# Patient Record
Sex: FEMALE | Race: WHITE | NOT HISPANIC OR LATINO | Employment: OTHER | URBAN - METROPOLITAN AREA
[De-identification: names, ages, dates, MRNs, and addresses within clinical notes are randomized per-mention and may not be internally consistent; named-entity substitution may affect disease eponyms.]

---

## 2018-10-02 ENCOUNTER — HOSPITAL ENCOUNTER (OUTPATIENT)
Dept: GENERAL RADIOLOGY | Facility: HOSPITAL | Age: 63
Discharge: HOME | End: 2018-10-02
Admitting: ORTHOPAEDIC SURGERY

## 2018-10-02 ENCOUNTER — APPOINTMENT (OUTPATIENT)
Dept: PREADMISSION TESTING | Facility: HOSPITAL | Age: 63
End: 2018-10-02

## 2018-10-02 VITALS — HEIGHT: 66 IN | WEIGHT: 203.93 LBS | BODY MASS INDEX: 32.77 KG/M2

## 2018-10-02 LAB
ANION GAP SERPL CALCULATED.3IONS-SCNC: 4 MMOL/L (ref 3–11)
BUN BLD-MCNC: 22 MG/DL (ref 9–23)
BUN/CREAT SERPL: 29.7 (ref 7–25)
CALCIUM SPEC-SCNC: 9.2 MG/DL (ref 8.7–10.4)
CHLORIDE SERPL-SCNC: 105 MMOL/L (ref 99–109)
CO2 SERPL-SCNC: 28 MMOL/L (ref 20–31)
CREAT BLD-MCNC: 0.74 MG/DL (ref 0.6–1.3)
DEPRECATED RDW RBC AUTO: 38.9 FL (ref 37–54)
ERYTHROCYTE [DISTWIDTH] IN BLOOD BY AUTOMATED COUNT: 12.6 % (ref 11.3–14.5)
GFR SERPL CREATININE-BSD FRML MDRD: 80 ML/MIN/1.73
GLUCOSE BLD-MCNC: 93 MG/DL (ref 70–100)
HBA1C MFR BLD: 5.6 % (ref 4.8–5.6)
HCT VFR BLD AUTO: 43.9 % (ref 34.5–44)
HGB BLD-MCNC: 14.3 G/DL (ref 11.5–15.5)
MCH RBC QN AUTO: 27.8 PG (ref 27–31)
MCHC RBC AUTO-ENTMCNC: 32.6 G/DL (ref 32–36)
MCV RBC AUTO: 85.2 FL (ref 80–99)
PLATELET # BLD AUTO: 241 10*3/MM3 (ref 150–450)
PMV BLD AUTO: 10.7 FL (ref 6–12)
POTASSIUM BLD-SCNC: 4.2 MMOL/L (ref 3.5–5.5)
RBC # BLD AUTO: 5.15 10*6/MM3 (ref 3.89–5.14)
SODIUM BLD-SCNC: 137 MMOL/L (ref 132–146)
WBC NRBC COR # BLD: 7.67 10*3/MM3 (ref 3.5–10.8)

## 2018-10-02 PROCEDURE — 93010 ELECTROCARDIOGRAM REPORT: CPT | Performed by: INTERNAL MEDICINE

## 2018-10-02 PROCEDURE — 80048 BASIC METABOLIC PNL TOTAL CA: CPT | Performed by: ORTHOPAEDIC SURGERY

## 2018-10-02 PROCEDURE — 36415 COLL VENOUS BLD VENIPUNCTURE: CPT

## 2018-10-02 PROCEDURE — 85027 COMPLETE CBC AUTOMATED: CPT | Performed by: ORTHOPAEDIC SURGERY

## 2018-10-02 PROCEDURE — 71046 X-RAY EXAM CHEST 2 VIEWS: CPT

## 2018-10-02 PROCEDURE — 83036 HEMOGLOBIN GLYCOSYLATED A1C: CPT | Performed by: ORTHOPAEDIC SURGERY

## 2018-10-02 PROCEDURE — 93005 ELECTROCARDIOGRAM TRACING: CPT

## 2018-10-02 RX ORDER — ACETAMINOPHEN 325 MG/1
650 TABLET ORAL EVERY 6 HOURS PRN
COMMUNITY

## 2018-10-02 ASSESSMENT — KOOS JR
KOOS JR SCORE: 68.284
KOOS JR SCORE: 7

## 2018-10-02 NOTE — DISCHARGE INSTRUCTIONS

## 2018-10-02 NOTE — PAT
EKG faxed to anesthesia, cleared by Dr. Dangelo for surgery.    Patient to apply Chlorhexadine wipes  to surgical area (as instructed) the night before procedure and the AM of procedure. Wipes provided.    Patient instructed to drink 20 ounces (or until full) of Gatorade or 20 ounces of G2 (if diabetic) and complete 3 hours before your surgery start time. (NO RED Gatorade or G2)    Patient verbalized understanding.    Patient attended joint replacement class today during PAT visit.    Patient instructed to return to PAT after joint class for directions to radiology for CXR, verbalized understanding

## 2018-10-11 ENCOUNTER — HOSPITAL ENCOUNTER (INPATIENT)
Facility: HOSPITAL | Age: 63
LOS: 1 days | Discharge: HOME | End: 2018-10-12
Attending: ORTHOPAEDIC SURGERY | Admitting: ORTHOPAEDIC SURGERY

## 2018-10-11 ENCOUNTER — ANESTHESIA EVENT (OUTPATIENT)
Dept: PERIOP | Facility: HOSPITAL | Age: 63
End: 2018-10-11

## 2018-10-11 ENCOUNTER — ANESTHESIA (OUTPATIENT)
Dept: PERIOP | Facility: HOSPITAL | Age: 63
End: 2018-10-11

## 2018-10-11 ENCOUNTER — APPOINTMENT (OUTPATIENT)
Dept: GENERAL RADIOLOGY | Facility: HOSPITAL | Age: 63
End: 2018-10-11

## 2018-10-11 DIAGNOSIS — Z74.09 IMPAIRED FUNCTIONAL MOBILITY, BALANCE, GAIT, AND ENDURANCE: Primary | ICD-10-CM

## 2018-10-11 DIAGNOSIS — Z78.9 IMPAIRED MOBILITY AND ADLS: ICD-10-CM

## 2018-10-11 DIAGNOSIS — Z96.652 STATUS POST TOTAL LEFT KNEE REPLACEMENT: ICD-10-CM

## 2018-10-11 DIAGNOSIS — Z74.09 IMPAIRED MOBILITY AND ADLS: ICD-10-CM

## 2018-10-11 PROBLEM — M17.12 PRIMARY OSTEOARTHRITIS OF LEFT KNEE: Status: ACTIVE | Noted: 2018-10-11

## 2018-10-11 PROCEDURE — 25010000002 HYDROMORPHONE PER 4 MG: Performed by: ORTHOPAEDIC SURGERY

## 2018-10-11 PROCEDURE — C1776 JOINT DEVICE (IMPLANTABLE): HCPCS | Performed by: ORTHOPAEDIC SURGERY

## 2018-10-11 PROCEDURE — C1713 ANCHOR/SCREW BN/BN,TIS/BN: HCPCS | Performed by: ORTHOPAEDIC SURGERY

## 2018-10-11 PROCEDURE — 0SRD0J9 REPLACEMENT OF LEFT KNEE JOINT WITH SYNTHETIC SUBSTITUTE, CEMENTED, OPEN APPROACH: ICD-10-PCS | Performed by: ORTHOPAEDIC SURGERY

## 2018-10-11 PROCEDURE — 25010000002 ROPIVACAINE PER 1 MG: Performed by: ORTHOPAEDIC SURGERY

## 2018-10-11 PROCEDURE — 25010000002 PHENYLEPHRINE PER 1 ML: Performed by: NURSE ANESTHETIST, CERTIFIED REGISTERED

## 2018-10-11 PROCEDURE — C1755 CATHETER, INTRASPINAL: HCPCS | Performed by: ORTHOPAEDIC SURGERY

## 2018-10-11 PROCEDURE — G8979 MOBILITY GOAL STATUS: HCPCS

## 2018-10-11 PROCEDURE — 97161 PT EVAL LOW COMPLEX 20 MIN: CPT

## 2018-10-11 PROCEDURE — 25010000002 ROPIVACAINE PER 1 MG: Performed by: NURSE ANESTHETIST, CERTIFIED REGISTERED

## 2018-10-11 PROCEDURE — 73560 X-RAY EXAM OF KNEE 1 OR 2: CPT

## 2018-10-11 PROCEDURE — 25010000003 MORPHINE PER 10 MG: Performed by: ORTHOPAEDIC SURGERY

## 2018-10-11 PROCEDURE — 25010000002 PROPOFOL 1000 MG/ML EMULSION: Performed by: NURSE ANESTHETIST, CERTIFIED REGISTERED

## 2018-10-11 PROCEDURE — 97116 GAIT TRAINING THERAPY: CPT

## 2018-10-11 PROCEDURE — G8978 MOBILITY CURRENT STATUS: HCPCS

## 2018-10-11 DEVICE — GEN II 7.5MM RESUR PAT 29MM
Type: IMPLANTABLE DEVICE | Site: PATELLA | Status: FUNCTIONAL
Brand: GENESIS II

## 2018-10-11 DEVICE — LEGION POSTERIOR STABILIZED HIGH                                    FLEX HIGHLY CROSS LINKED                                    POLYETHYLENE SIZE 3-4 9MM
Type: IMPLANTABLE DEVICE | Site: KNEE | Status: FUNCTIONAL
Brand: LEGION

## 2018-10-11 DEVICE — LEGION POSTERIOR STABILIZED                                    OXINIUM FEMORAL SIZE 6 LEFT
Type: IMPLANTABLE DEVICE | Site: KNEE | Status: FUNCTIONAL
Brand: LEGION

## 2018-10-11 DEVICE — GENESIS II NON-POROUS TIBIAL                                    BASEPLATE SIZE 4 LEFT
Type: IMPLANTABLE DEVICE | Site: KNEE | Status: FUNCTIONAL
Brand: GENESIS II

## 2018-10-11 DEVICE — IMPLANTABLE DEVICE: Type: IMPLANTABLE DEVICE | Site: KNEE | Status: FUNCTIONAL

## 2018-10-11 DEVICE — SMARTSET HIGH PERFORMANCE MV MEDIUM VISCOSITY BONE CEMENT 40G
Type: IMPLANTABLE DEVICE | Status: FUNCTIONAL
Brand: SMARTSET

## 2018-10-11 RX ORDER — CLINDAMYCIN PHOSPHATE 900 MG/50ML
900 INJECTION INTRAVENOUS ONCE
Status: COMPLETED | OUTPATIENT
Start: 2018-10-11 | End: 2018-10-11

## 2018-10-11 RX ORDER — ACETAMINOPHEN 325 MG/1
650 TABLET ORAL EVERY 4 HOURS PRN
Status: DISCONTINUED | OUTPATIENT
Start: 2018-10-11 | End: 2018-10-12 | Stop reason: HOSPADM

## 2018-10-11 RX ORDER — HYDROMORPHONE HYDROCHLORIDE 1 MG/ML
0.5 INJECTION, SOLUTION INTRAMUSCULAR; INTRAVENOUS; SUBCUTANEOUS
Status: DISCONTINUED | OUTPATIENT
Start: 2018-10-11 | End: 2018-10-11 | Stop reason: HOSPADM

## 2018-10-11 RX ORDER — MAGNESIUM HYDROXIDE 1200 MG/15ML
LIQUID ORAL AS NEEDED
Status: DISCONTINUED | OUTPATIENT
Start: 2018-10-11 | End: 2018-10-11 | Stop reason: HOSPADM

## 2018-10-11 RX ORDER — DIPHENHYDRAMINE HYDROCHLORIDE 50 MG/ML
25 INJECTION INTRAMUSCULAR; INTRAVENOUS EVERY 6 HOURS PRN
Status: DISCONTINUED | OUTPATIENT
Start: 2018-10-11 | End: 2018-10-12 | Stop reason: HOSPADM

## 2018-10-11 RX ORDER — DOCUSATE SODIUM 100 MG/1
100 CAPSULE, LIQUID FILLED ORAL 2 TIMES DAILY PRN
Status: DISCONTINUED | OUTPATIENT
Start: 2018-10-11 | End: 2018-10-12 | Stop reason: HOSPADM

## 2018-10-11 RX ORDER — NALOXONE HCL 0.4 MG/ML
0.1 VIAL (ML) INJECTION
Status: DISCONTINUED | OUTPATIENT
Start: 2018-10-11 | End: 2018-10-12 | Stop reason: HOSPADM

## 2018-10-11 RX ORDER — MINERAL OIL AND WHITE PETROLATUM 150; 830 MG/G; MG/G
OINTMENT OPHTHALMIC
Status: DISCONTINUED | OUTPATIENT
Start: 2018-10-11 | End: 2018-10-12 | Stop reason: HOSPADM

## 2018-10-11 RX ORDER — ASPIRIN 325 MG
325 TABLET, DELAYED RELEASE (ENTERIC COATED) ORAL DAILY
Status: DISCONTINUED | OUTPATIENT
Start: 2018-10-12 | End: 2018-10-12 | Stop reason: HOSPADM

## 2018-10-11 RX ORDER — CLINDAMYCIN PHOSPHATE 900 MG/50ML
900 INJECTION INTRAVENOUS EVERY 8 HOURS
Status: COMPLETED | OUTPATIENT
Start: 2018-10-11 | End: 2018-10-12

## 2018-10-11 RX ORDER — HYDROCODONE BITARTRATE AND ACETAMINOPHEN 7.5; 325 MG/1; MG/1
1 TABLET ORAL EVERY 4 HOURS PRN
Status: DISCONTINUED | OUTPATIENT
Start: 2018-10-11 | End: 2018-10-12 | Stop reason: HOSPADM

## 2018-10-11 RX ORDER — ONDANSETRON 2 MG/ML
4 INJECTION INTRAMUSCULAR; INTRAVENOUS ONCE AS NEEDED
Status: DISCONTINUED | OUTPATIENT
Start: 2018-10-11 | End: 2018-10-11 | Stop reason: HOSPADM

## 2018-10-11 RX ORDER — DIPHENHYDRAMINE HCL 25 MG
25 CAPSULE ORAL EVERY 6 HOURS PRN
Status: DISCONTINUED | OUTPATIENT
Start: 2018-10-11 | End: 2018-10-12 | Stop reason: HOSPADM

## 2018-10-11 RX ORDER — SENNA AND DOCUSATE SODIUM 50; 8.6 MG/1; MG/1
2 TABLET, FILM COATED ORAL 2 TIMES DAILY PRN
Status: DISCONTINUED | OUTPATIENT
Start: 2018-10-11 | End: 2018-10-12 | Stop reason: HOSPADM

## 2018-10-11 RX ORDER — FENTANYL CITRATE 50 UG/ML
50 INJECTION, SOLUTION INTRAMUSCULAR; INTRAVENOUS
Status: DISCONTINUED | OUTPATIENT
Start: 2018-10-11 | End: 2018-10-11 | Stop reason: HOSPADM

## 2018-10-11 RX ORDER — FAMOTIDINE 20 MG/1
20 TABLET, FILM COATED ORAL ONCE
Status: COMPLETED | OUTPATIENT
Start: 2018-10-11 | End: 2018-10-11

## 2018-10-11 RX ORDER — LIDOCAINE HYDROCHLORIDE 10 MG/ML
0.5 INJECTION, SOLUTION EPIDURAL; INFILTRATION; INTRACAUDAL; PERINEURAL ONCE AS NEEDED
Status: COMPLETED | OUTPATIENT
Start: 2018-10-11 | End: 2018-10-11

## 2018-10-11 RX ORDER — SODIUM CHLORIDE 0.9 % (FLUSH) 0.9 %
3-10 SYRINGE (ML) INJECTION AS NEEDED
Status: DISCONTINUED | OUTPATIENT
Start: 2018-10-11 | End: 2018-10-11 | Stop reason: HOSPADM

## 2018-10-11 RX ORDER — ACETAMINOPHEN 650 MG/1
650 SUPPOSITORY RECTAL EVERY 4 HOURS PRN
Status: DISCONTINUED | OUTPATIENT
Start: 2018-10-11 | End: 2018-10-12 | Stop reason: HOSPADM

## 2018-10-11 RX ORDER — SENNOSIDES 8.6 MG
1 TABLET ORAL NIGHTLY
Status: DISCONTINUED | OUTPATIENT
Start: 2018-10-11 | End: 2018-10-12 | Stop reason: HOSPADM

## 2018-10-11 RX ORDER — FAMOTIDINE 10 MG/ML
20 INJECTION, SOLUTION INTRAVENOUS ONCE
Status: DISCONTINUED | OUTPATIENT
Start: 2018-10-11 | End: 2018-10-11 | Stop reason: HOSPADM

## 2018-10-11 RX ORDER — HYDROCODONE BITARTRATE AND ACETAMINOPHEN 7.5; 325 MG/1; MG/1
2 TABLET ORAL EVERY 4 HOURS PRN
Status: DISCONTINUED | OUTPATIENT
Start: 2018-10-11 | End: 2018-10-12 | Stop reason: HOSPADM

## 2018-10-11 RX ORDER — TRANEXAMIC ACID 100 MG/ML
INJECTION, SOLUTION INTRAVENOUS AS NEEDED
Status: DISCONTINUED | OUTPATIENT
Start: 2018-10-11 | End: 2018-10-11 | Stop reason: SURG

## 2018-10-11 RX ORDER — BUPIVACAINE HYDROCHLORIDE 2.5 MG/ML
INJECTION, SOLUTION EPIDURAL; INFILTRATION; INTRACAUDAL AS NEEDED
Status: DISCONTINUED | OUTPATIENT
Start: 2018-10-11 | End: 2018-10-11 | Stop reason: SURG

## 2018-10-11 RX ORDER — OXYCODONE HYDROCHLORIDE AND ACETAMINOPHEN 5; 325 MG/1; MG/1
1 TABLET ORAL EVERY 4 HOURS PRN
Status: DISCONTINUED | OUTPATIENT
Start: 2018-10-11 | End: 2018-10-12 | Stop reason: HOSPADM

## 2018-10-11 RX ORDER — SODIUM CHLORIDE 0.9 % (FLUSH) 0.9 %
3 SYRINGE (ML) INJECTION EVERY 12 HOURS SCHEDULED
Status: DISCONTINUED | OUTPATIENT
Start: 2018-10-11 | End: 2018-10-11 | Stop reason: HOSPADM

## 2018-10-11 RX ORDER — BISACODYL 5 MG/1
10 TABLET, DELAYED RELEASE ORAL DAILY PRN
Status: DISCONTINUED | OUTPATIENT
Start: 2018-10-11 | End: 2018-10-12 | Stop reason: HOSPADM

## 2018-10-11 RX ORDER — ACETAMINOPHEN 160 MG/5ML
650 SOLUTION ORAL EVERY 4 HOURS PRN
Status: DISCONTINUED | OUTPATIENT
Start: 2018-10-11 | End: 2018-10-12 | Stop reason: HOSPADM

## 2018-10-11 RX ORDER — SODIUM CHLORIDE, SODIUM LACTATE, POTASSIUM CHLORIDE, CALCIUM CHLORIDE 600; 310; 30; 20 MG/100ML; MG/100ML; MG/100ML; MG/100ML
9 INJECTION, SOLUTION INTRAVENOUS CONTINUOUS
Status: DISCONTINUED | OUTPATIENT
Start: 2018-10-11 | End: 2018-10-12 | Stop reason: HOSPADM

## 2018-10-11 RX ORDER — ONDANSETRON 2 MG/ML
4 INJECTION INTRAMUSCULAR; INTRAVENOUS EVERY 6 HOURS PRN
Status: DISCONTINUED | OUTPATIENT
Start: 2018-10-11 | End: 2018-10-12 | Stop reason: HOSPADM

## 2018-10-11 RX ORDER — MELOXICAM 7.5 MG/1
15 TABLET ORAL DAILY
Status: DISCONTINUED | OUTPATIENT
Start: 2018-10-11 | End: 2018-10-12 | Stop reason: HOSPADM

## 2018-10-11 RX ORDER — BUPIVACAINE HYDROCHLORIDE 5 MG/ML
INJECTION, SOLUTION EPIDURAL; INTRACAUDAL AS NEEDED
Status: DISCONTINUED | OUTPATIENT
Start: 2018-10-11 | End: 2018-10-11 | Stop reason: SURG

## 2018-10-11 RX ORDER — BISACODYL 10 MG
10 SUPPOSITORY, RECTAL RECTAL DAILY PRN
Status: DISCONTINUED | OUTPATIENT
Start: 2018-10-11 | End: 2018-10-12 | Stop reason: HOSPADM

## 2018-10-11 RX ORDER — LIDOCAINE HYDROCHLORIDE 10 MG/ML
INJECTION, SOLUTION EPIDURAL; INFILTRATION; INTRACAUDAL; PERINEURAL AS NEEDED
Status: DISCONTINUED | OUTPATIENT
Start: 2018-10-11 | End: 2018-10-11 | Stop reason: SURG

## 2018-10-11 RX ORDER — ONDANSETRON 4 MG/1
4 TABLET, FILM COATED ORAL EVERY 6 HOURS PRN
Status: DISCONTINUED | OUTPATIENT
Start: 2018-10-11 | End: 2018-10-12 | Stop reason: HOSPADM

## 2018-10-11 RX ORDER — SODIUM CHLORIDE 9 MG/ML
100 INJECTION, SOLUTION INTRAVENOUS CONTINUOUS
Status: DISCONTINUED | OUTPATIENT
Start: 2018-10-11 | End: 2018-10-12 | Stop reason: HOSPADM

## 2018-10-11 RX ORDER — HYDROMORPHONE HYDROCHLORIDE 1 MG/ML
0.5 INJECTION, SOLUTION INTRAMUSCULAR; INTRAVENOUS; SUBCUTANEOUS
Status: DISCONTINUED | OUTPATIENT
Start: 2018-10-11 | End: 2018-10-12 | Stop reason: HOSPADM

## 2018-10-11 RX ADMIN — MINERAL OIL AND WHITE PETROLATUM: 150; 830 OINTMENT OPHTHALMIC at 11:58

## 2018-10-11 RX ADMIN — PROPOFOL 55 MCG/KG/MIN: 10 INJECTION, EMULSION INTRAVENOUS at 07:38

## 2018-10-11 RX ADMIN — TRANEXAMIC ACID 1000 MG: 100 INJECTION, SOLUTION INTRAVENOUS at 07:40

## 2018-10-11 RX ADMIN — LIDOCAINE HYDROCHLORIDE 0.5 ML: 10 INJECTION, SOLUTION EPIDURAL; INFILTRATION; INTRACAUDAL; PERINEURAL at 06:43

## 2018-10-11 RX ADMIN — OXYCODONE HYDROCHLORIDE AND ACETAMINOPHEN 1 TABLET: 5; 325 TABLET ORAL at 12:16

## 2018-10-11 RX ADMIN — LIDOCAINE HYDROCHLORIDE 3 ML: 10 INJECTION, SOLUTION EPIDURAL; INFILTRATION; INTRACAUDAL; PERINEURAL at 07:32

## 2018-10-11 RX ADMIN — MELOXICAM 15 MG: 7.5 TABLET ORAL at 12:44

## 2018-10-11 RX ADMIN — CLINDAMYCIN PHOSPHATE 900 MG: 18 INJECTION, SOLUTION INTRAVENOUS at 07:36

## 2018-10-11 RX ADMIN — FAMOTIDINE 20 MG: 20 TABLET ORAL at 06:43

## 2018-10-11 RX ADMIN — BUPIVACAINE HYDROCHLORIDE 20 ML: 2.5 INJECTION, SOLUTION EPIDURAL; INFILTRATION; INTRACAUDAL; PERINEURAL at 09:42

## 2018-10-11 RX ADMIN — HYDROCODONE BITARTRATE AND ACETAMINOPHEN 2 TABLET: 7.5; 325 TABLET ORAL at 20:38

## 2018-10-11 RX ADMIN — ONDANSETRON 4 MG: 4 TABLET, FILM COATED ORAL at 16:34

## 2018-10-11 RX ADMIN — PHENYLEPHRINE HYDROCHLORIDE 80 MCG: 10 INJECTION INTRAVENOUS at 08:28

## 2018-10-11 RX ADMIN — SODIUM CHLORIDE, POTASSIUM CHLORIDE, SODIUM LACTATE AND CALCIUM CHLORIDE: 600; 310; 30; 20 INJECTION, SOLUTION INTRAVENOUS at 08:47

## 2018-10-11 RX ADMIN — SODIUM CHLORIDE, POTASSIUM CHLORIDE, SODIUM LACTATE AND CALCIUM CHLORIDE 9 ML/HR: 600; 310; 30; 20 INJECTION, SOLUTION INTRAVENOUS at 06:43

## 2018-10-11 RX ADMIN — PHENYLEPHRINE HYDROCHLORIDE 80 MCG: 10 INJECTION INTRAVENOUS at 08:15

## 2018-10-11 RX ADMIN — BUPIVACAINE HYDROCHLORIDE 2 ML: 5 INJECTION, SOLUTION EPIDURAL; INTRACAUDAL at 07:33

## 2018-10-11 RX ADMIN — PHENYLEPHRINE HYDROCHLORIDE 80 MCG: 10 INJECTION INTRAVENOUS at 08:50

## 2018-10-11 RX ADMIN — HYDROMORPHONE HYDROCHLORIDE 0.5 MG: 1 INJECTION, SOLUTION INTRAMUSCULAR; INTRAVENOUS; SUBCUTANEOUS at 10:51

## 2018-10-11 RX ADMIN — CLINDAMYCIN PHOSPHATE 900 MG: 900 INJECTION, SOLUTION INTRAVENOUS at 16:30

## 2018-10-11 RX ADMIN — SENNOSIDES 8.6 MG: 8.6 TABLET, FILM COATED ORAL at 20:39

## 2018-10-11 RX ADMIN — HYDROMORPHONE HYDROCHLORIDE 0.5 MG: 1 INJECTION, SOLUTION INTRAMUSCULAR; INTRAVENOUS; SUBCUTANEOUS at 12:44

## 2018-10-11 RX ADMIN — ONDANSETRON 4 MG: 4 TABLET, FILM COATED ORAL at 12:16

## 2018-10-11 RX ADMIN — PHENYLEPHRINE HYDROCHLORIDE 80 MCG: 10 INJECTION INTRAVENOUS at 08:34

## 2018-10-11 RX ADMIN — TRANEXAMIC ACID 1000 MG: 100 INJECTION, SOLUTION INTRAVENOUS at 09:03

## 2018-10-11 RX ADMIN — SODIUM CHLORIDE 100 ML/HR: 9 INJECTION, SOLUTION INTRAVENOUS at 10:52

## 2018-10-11 RX ADMIN — PHENYLEPHRINE HYDROCHLORIDE 80 MCG: 10 INJECTION INTRAVENOUS at 08:21

## 2018-10-11 RX ADMIN — OXYCODONE HYDROCHLORIDE AND ACETAMINOPHEN 1 TABLET: 5; 325 TABLET ORAL at 16:30

## 2018-10-11 RX ADMIN — ROPIVACAINE HYDROCHLORIDE 10 ML/HR: 5 INJECTION, SOLUTION EPIDURAL; INFILTRATION; PERINEURAL at 09:52

## 2018-10-11 RX ADMIN — PHENYLEPHRINE HYDROCHLORIDE 160 MCG: 10 INJECTION INTRAVENOUS at 08:56

## 2018-10-11 NOTE — PLAN OF CARE
Problem: Patient Care Overview  Goal: Plan of Care Review  Outcome: Ongoing (interventions implemented as appropriate)   10/11/18 7251   Plan of Care Review   Progress improving   OTHER   Outcome Summary Patient ambulated 130 feet with RW, limited by pain. ROM to be initiated POD#1. Plan is d/c home with family and HHPT tomorrow. Will continue to progress as able. PADD score of 8.    Coping/Psychosocial   Plan of Care Reviewed With patient;family

## 2018-10-11 NOTE — H&P
Patient Name: Evelyn Medina  MRN: [id number]  : 1955  DOS: 10/11/2018    Attending: Dewey Jimenez,*    Primary Care Provider: Provider, No Known      Chief complaint:  Left knee pain    Subjective   Patient is a 63 y.o. female presented for left total knee arthroplasty by Dr. Jimenez under spinal anesthesia. She tolerated surgery well and is admitted for further medical management. Her knee has been painful for about a year and a half. She denies use of assistive device for ambulation or recent falls.    When seen postop she is doing well. She reports moderate knee pain. She denies nausea, shortness of breath or chest pain. No hx of DVT or PE.    ( Above is noted/ agree. Doing well. Fair pain control. Ambulated to BR when I saw her , then walked 130 ft with PT with RW. Mostly bothered by right eye irritation postop .)wy       Allergies:  Allergies   Allergen Reactions   • Penicillins Rash   • Ibuprofen Other (See Comments)     Irregular heart beat        Meds:  Prescriptions Prior to Admission   Medication Sig Dispense Refill Last Dose   • acetaminophen (TYLENOL) 325 MG tablet Take 650 mg by mouth Every 6 (Six) Hours As Needed for Mild Pain .   Past Month at Unknown time   • Lifitegrast (XIIDRA OP) Administer 1 drop to both eyes 2 (Two) Times a Day.   10/11/2018 at 0400   • Multiple Vitamins-Minerals (WOMENS MULTIVITAMIN PO) Take 1 tablet by mouth Daily.   10/10/2018 at 0800   • polyethyl glycol-propyl glycol (SYSTANE) 0.4-0.3 % solution ophthalmic solution Administer 1 drop to both eyes Every Night.   10/10/2018 at 2200   • Sennosides (SENNA LAX PO) Take 1 tablet by mouth Daily.   10/10/2018 at 0800       History:   Past Medical History:   Diagnosis Date   • Arthritis    • History of transfusion    • Osteoarthritis    • PONV (postoperative nausea and vomiting)      Past Surgical History:   Procedure Laterality Date   •  SECTION      X 2   • EYE SURGERY Left    • KNEE SURGERY Left      "torn menisus     History reviewed. No pertinent family history.  Social History   Substance Use Topics   • Smoking status: Former Smoker     Quit date: 2000   • Smokeless tobacco: Never Used   • Alcohol use No   She lives with her son. She has 2 children. She is retired RN.    Review of Systems  All systems were reviewed and negative except for:  Gastrointestinal: postitive for  constipation    Vital Signs  /66 (BP Location: Left arm, Patient Position: Lying)   Pulse 66   Temp 97.6 °F (36.4 °C) (Oral)   Resp 16   Ht 167.6 cm (66\")   Wt 92.5 kg (203 lb 14.8 oz)   SpO2 98%   BMI 32.91 kg/m²     Physical Exam:    General Appearance:    Alert, cooperative, in no acute distress   Head:    Normocephalic, without obvious abnormality, atraumatic   Eyes:            Lids and lashes normal, conjunctivae and sclerae normal, no   icterus, no pallor, corneas clear ( slight redness/ right eye)wy   Ears:    Ears appear intact with no abnormalities noted   Neck:   No adenopathy, supple, trachea midline, no thyromegaly, no     carotid bruit, no JVD   Lungs:     Clear to auscultation,respirations regular, even and                   unlabored    Heart:    Regular rhythm and normal rate, normal S1 and S2, no            murmur, no gallop, no rub, no click   Abdomen:     Normal bowel sounds, no masses, no organomegaly, soft        non-tender, non-distended, no guarding, no rebound                 tenderness   Genitalia:    Deferred   Extremities:  Left knee ACE wrap CDI. Nerve block present   Pulses:   Pulses palpable and equal bilaterally   Skin:   No bleeding, bruising or rash   Neurologic:   Cranial nerves 2 - 12 grossly intact, sensation intact. Flexion and dorsiflexion intact bilateral feet.        I reviewed the patient's new clinical results.     Results for AMINA DE DIOS (MRN [id number]) as of 10/11/2018 11:58   Ref. Range 10/2/2018 12:24   Glucose Latest Ref Range: 70 - 100 mg/dL 93   Sodium Latest Ref Range: " 132 - 146 mmol/L 137   Potassium Latest Ref Range: 3.5 - 5.5 mmol/L 4.2   CO2 Latest Ref Range: 20.0 - 31.0 mmol/L 28.0   Chloride Latest Ref Range: 99 - 109 mmol/L 105   Anion Gap Latest Ref Range: 3.0 - 11.0 mmol/L 4.0   Creatinine Latest Ref Range: 0.60 - 1.30 mg/dL 0.74   BUN Latest Ref Range: 9 - 23 mg/dL 22   BUN/Creatinine Ratio Latest Ref Range: 7.0 - 25.0  29.7 (H)   Calcium Latest Ref Range: 8.7 - 10.4 mg/dL 9.2   eGFR Non African Amer Latest Ref Range: >60 mL/min/1.73 80   Hemoglobin A1C Latest Ref Range: 4.80 - 5.60 % 5.60   WBC Latest Ref Range: 3.50 - 10.80 10*3/mm3 7.67   RBC Latest Ref Range: 3.89 - 5.14 10*6/mm3 5.15 (H)   Hemoglobin Latest Ref Range: 11.5 - 15.5 g/dL 14.3   Hematocrit Latest Ref Range: 34.5 - 44.0 % 43.9   RDW Latest Ref Range: 11.3 - 14.5 % 12.6   MCV Latest Ref Range: 80.0 - 99.0 fL 85.2   MCH Latest Ref Range: 27.0 - 31.0 pg 27.8   MCHC Latest Ref Range: 32.0 - 36.0 g/dL 32.6   MPV Latest Ref Range: 6.0 - 12.0 fL 10.7   Platelets Latest Ref Range: 150 - 450 10*3/mm3 241     Assessment and Plan:     Status post total left knee replacement    Primary osteoarthritis of left knee      Plan  1. PT/OT- early ambulation post op  2. Pain control-prns, AC nerve block   3. IS-encourage  4. DVT proph- mechs/ASA  5. Bowel regimen  6. Resume home medications as appropriate  7. Monitor post-op labs  8. DC planning for home    I have personally performed the evaluation on this patient. My history is consistent  with HPI obtained. My exam findings are listed above. I have personally reviewed and discussed the above formulated treatment plan with pt, family and . HARINDER.wy.      HARINDER Kelly  10/11/18  11:56 AM

## 2018-10-11 NOTE — ANESTHESIA POSTPROCEDURE EVALUATION
Patient: Evelyn Medina    Procedure Summary     Date:  10/11/18 Room / Location:   [facility] /  [facility]    Anesthesia Start:  0724 Anesthesia Stop:      Procedure:  LEFT TOTAL KNEE REPLACEMENT (Left Knee) Diagnosis:       Primary osteoarthritis of left knee      (left knee osteoarthritis)    Surgeon:  Dewey Jimenez MD Provider:  Remington Patel MD    Anesthesia Type:  spinal ASA Status:  Not recorded          Anesthesia Type: spinal  Last vitals  BP   (!) 89/51 (10/11/18 0935)   Temp   97.8 °F (36.6 °C) (10/11/18 0930)   Pulse   64 (10/11/18 0935)   Resp   16 (10/11/18 0930)     SpO2   93 % (10/11/18 0935)     Post Anesthesia Care and Evaluation    Patient location during evaluation: PACU  Patient participation: complete - patient participated  Level of consciousness: awake  Pain score: 0  Pain management: adequate  Airway patency: patent  Anesthetic complications: No anesthetic complications  PONV Status: none  Cardiovascular status: acceptable and stable  Respiratory status: nasal cannula, unassisted, acceptable and spontaneous ventilation  Hydration status: acceptable    Comments: Patient complains of painful right eye.  Patch applied by PACU nurse.

## 2018-10-11 NOTE — ANESTHESIA PROCEDURE NOTES
Spinal Block      Patient location during procedure: OR  Indication:at surgeon's request  Performed By  CRNA: JACLYN SALMON  Preanesthetic Checklist  Completed: patient identified, site marked, surgical consent, pre-op evaluation, timeout performed, IV checked, risks and benefits discussed and monitors and equipment checked  Spinal Block Prep:  Patient Position:sitting  Sterile Tech:cap, gloves, sterile barriers and mask  Prep:Chloraprep  Patient Monitoring:blood pressure monitoring, continuous pulse oximetry and EKG  Spinal Block Procedure  Approach:midline  Guidance:landmark technique and palpation technique  Location:L4-L5  Needle Type:Jeimy  Needle Gauge:25 G  Placement of Spinal needle event:cerebrospinal fluid aspirated    Fluid Appearance:clear  Post Assessment  Patient Tolerance:patient tolerated the procedure well with no apparent complications  Complications no  Additional Notes  Procedure:  Pt assisted to sitting position, with legs in position of comfort over side of bed.  Pt. instructed in optimal spine presentation, the spine was prepped/ Draped and the skin at insertion site was anesthetized with 1% Lidocaine 2 ml.  The spinal needle was then advanced until CSF flow was obtained and LA was injected:      Marcaine 0.5% PSF injected 10 Mg.

## 2018-10-11 NOTE — ANESTHESIA PROCEDURE NOTES
Adductor Canal Catheter      Patient location during procedure: post-op  Reason for block: at surgeon's request and post-op pain management  Performed by  CRNA: JACLYN SALMON  Assisted by: DAVID VINES  Preanesthetic Checklist  Completed: patient identified, site marked, surgical consent, pre-op evaluation, timeout performed, IV checked, risks and benefits discussed and monitors and equipment checked  Prep:  Sterile barriers:cap, gloves, mask and sterile barriers  Prep: ChloraPrep  Patient monitoring: blood pressure monitoring, continuous pulse oximetry and EKG  Procedure    Guidance:ultrasound guided  Images:still images obtained    Block Type:adductor canal block  Injection Technique:catheter  Needle Type:Tuohy and echogenic  Needle Gauge:18 G    Catheter Size:20 G (20g)  Cath Depth at skin: 9 cm  Medications  Local Injected:bupivacaine 0.25% Local Amount Injected:20 (ml)mL  Post Assessment  Injection Assessment: negative aspiration for heme, incremental injection and no paresthesia on injection  Patient Tolerance:comfortable throughout block  Complications:no  Additional Notes  Procedure:             The pt was placed in the Supine position.  The Insertion site was  prepped and Draped in sterile fashion.  The pt was anesthetized with  IV Sedation( see meds).  Skin and cutaneous tissue was infiltrated and anesthetized with 1% Lidocaine 3 mls via a 25g needle.  A BBraun 4 inch 18g echogenic needle was then  inserted approximately midline, mid-thigh and advanced In-plane with Ultrasound guidance.  Normal Saline PSF was utilized for hydrodissection of tissue.  The Vastus medialis and Sartorius muscle where visualized and the needle tip was placed in the adductor canal,  lateral to the femoral artery.  LA injection spread was visualized, injection was incremental 1-5ml, injection pressure was normal or little, no intraneural injection, no vascular injection.  LA dose was injected thru the needle(see dose above).   A BBraun 20g wire stylet catheter was placed via the needle with ultrasound visualization and confirmation with NS fluid bolus. The labeled Catheter was then secured to skin at insertion site with skin afix and steristrips to curled catheter and CHG transparent dressing.  Thank you.

## 2018-10-11 NOTE — BRIEF OP NOTE
TOTAL KNEE ARTHROPLASTY  Progress Note    Evelyn Medina  10/11/2018    Pre-op Diagnosis:   left knee osteoarthritis       Post-Op Diagnosis Codes:     * Primary osteoarthritis of left knee [M17.12]    Procedure/CPT® Codes:  ND TOTAL KNEE ARTHROPLASTY [43198]    Procedure(s):  LEFT TOTAL KNEE REPLACEMENT    Surgeon(s):  Dewey Jimenez MD     Assistant: IAIN Molina    Anesthesia: Spinal, local and adductor canal catheter    Staff:   Circulator: Corey Gillette RN  Scrub Person: Alexsandra Levy  Nursing Assistant: Beth Taylor  Assistant: Desmond Aranda RNFA  [place]: Isabelle Hidalgo RN    Estimated Blood Loss: none    Urine Voided: * No values recorded between 10/11/2018  7:24 AM and 10/11/2018  9:21 AM *    Specimens:                None      Drains:  none    Findings: Left knee severe tricompartmental degenerative changes    Complications: None    Implants: [employer] posterior stabilized total knee arthroplasty with a size 6 femur, 4 tibia, 9 polyethylene and 29 patella    Tourniquet: 82 minutes at 300 mmHg      Dewey Jimenez MD     Date: 10/11/2018  Time: 9:33 AM

## 2018-10-11 NOTE — NURSING NOTE
Patient is complaining of right eye discomfort.  She said it's bothered her since she woke up from surgery.  She said it feels like it's scratched or like something is in it.  Her eye is red, watery, and a little swollen.  Notified Rito Moe CRNA.  Rito came up to assess patient.  He irrigated it with saline and put some eye drops in.  Artificial tears ointment was ordered and placed in her eye, along with a patch over top of it.  Patient reports immediate relief.

## 2018-10-11 NOTE — PLAN OF CARE
Problem: Patient Care Overview  Goal: Plan of Care Review   10/11/18 0360   Plan of Care Review   Progress improving   OTHER   Outcome Summary POD #0. Pt doing very well. Pain well controlled. Nerve cath @ 8ml/hr. Ambulating x1 assist. Voiding WNL. VSS.    Coping/Psychosocial   Plan of Care Reviewed With patient;family

## 2018-10-11 NOTE — ANESTHESIA PREPROCEDURE EVALUATION
Anesthesia Evaluation     Patient summary reviewed and Nursing notes reviewed   history of anesthetic complications: PONV  NPO Solid Status: > 8 hours  NPO Liquid Status: > 8 hours           Airway   Mallampati: II  TM distance: >3 FB  Neck ROM: full  No difficulty expected  Dental      Pulmonary - negative pulmonary ROS and normal exam   Cardiovascular - negative cardio ROS and normal exam        Neuro/Psych- negative ROS  GI/Hepatic/Renal/Endo - negative ROS     Musculoskeletal     Abdominal    Substance History      OB/GYN negative ob/gyn ROS         Other   (+) arthritis                     Anesthesia Plan    ASA 3     spinal   (Adductor canal post op)  intravenous induction   Anesthetic plan, all risks, benefits, and alternatives have been provided, discussed and informed consent has been obtained with: patient.    Plan discussed with CRNA.

## 2018-10-11 NOTE — PROGRESS NOTES
LOS: 0 days   Patient Care Team:  Provider, No Known as PCP - General    Chief Complaint: Right eye pain s/p knee replacement under spinal anesthesia with propofol sedation.    Subjective Pt right eye painful to open, no visible foreign objects to exam, perrla, upper eyelid, reddened, slightly edematous,     History of Present Illness:  Hx of dry eyes, no use of contacts    Subjective    History taken from: patient    Objective     Vital Signs  Temp:  [97.2 °F (36.2 °C)-97.8 °F (36.6 °C)] 97.6 °F (36.4 °C)  Heart Rate:  [58-74] 66  Resp:  [16] 16  BP: ()/(46-95) 120/66    Objective    Results Review:     None    Medication Review:     Assessment/Plan        Status post total left knee replacement    Primary osteoarthritis of left knee      Assessment & Plan  Irrigated eye with sterile NS, cyclopentolate 2% 2 gtts applied, RN to apply antibiotic opthalmic ointment and patch till am Friday.  Thank you    Rito Moe CRNA  10/11/18  3:49 PM    Time: 10min

## 2018-10-11 NOTE — H&P
"Pre-Op H&P  Evelyn Medina  [id number]  1955    Chief complaint: Left knee pain    HPI:    Patient is a 63 y.o.female who presents with left knee pain and found to have left knee osteoarthritis and here today for left total knee replacement.     Review of Systems:  General ROS: negative for chills, fever or skin lesions;  No changes since last office visit  Cardiovascular ROS: no chest pain or dyspnea on exertion  Respiratory ROS: no cough, shortness of breath, or wheezing    Allergies:   Allergies   Allergen Reactions   • Penicillins Rash   • Ibuprofen Other (See Comments)     Irregular heart beat        Home Meds:    No current facility-administered medications on file prior to encounter.      No current outpatient prescriptions on file prior to encounter.       PMH:   Past Medical History:   Diagnosis Date   • Arthritis    • History of transfusion    • Osteoarthritis    • PONV (postoperative nausea and vomiting)      PSH:    Past Surgical History:   Procedure Laterality Date   •  SECTION      X 2   • EYE SURGERY Left    • KNEE SURGERY Left     torn menisus     Social History:   Tobacco:   History   Smoking Status   • Former Smoker   • Quit date:    Smokeless Tobacco   • Never Used      Alcohol:     History   Alcohol Use No       Vitals:           /54 (BP Location: Right arm, Patient Position: Lying)   Pulse 70   Temp 97.2 °F (36.2 °C) (Temporal Artery )   Resp 16   Ht 167.6 cm (66\")   Wt 92.5 kg (203 lb 14.8 oz)   SpO2 93%   BMI 32.91 kg/m²     Physical Exam:  General Appearance:    Alert, cooperative, no distress, appears stated age   Head:    Normocephalic, without obvious abnormality, atraumatic   Lungs:     Clear to auscultation bilaterally, respirations unlabored    Heart:   Regular rate and rhythm, S1 and S2 normal, no murmur, rub    or gallop    Abdomen:    Soft, non-tender.  +bowel sounds   Breast Exam:    deferred   Genitalia:    deferred   Extremities:   Extremities " normal, atraumatic, no cyanosis or edema   Skin:   Skin color, texture, turgor normal, no rashes or lesions   Neurologic:   Grossly intact   Results Review  I reviewed the patient's new clinical results.    Cancer Staging (if applicable)  Cancer Patient: __ yes _x_no __unknown; If yes, clinical stage T:__ N:__M:__, stage group or __N/A    Impression: Left knee osteoarthritis    Plan: Left total knee replacement    Maira Silverman, HARINDER   10/11/2018   6:44 AM

## 2018-10-11 NOTE — THERAPY EVALUATION
Acute Care - Physical Therapy Initial Evaluation  [facility]     Patient Name: Evelyn Medina  : 1955  MRN: [id number]  Today's Date: 10/11/2018   Onset of Illness/Injury or Date of Surgery: 10/11/18  Date of Referral to PT: 10/11/18  Referring Physician: MD Tony      Admit Date: 10/11/2018    Visit Dx:     ICD-10-CM ICD-9-CM   1. Impaired functional mobility, balance, gait, and endurance Z74.09 V49.89     Patient Active Problem List   Diagnosis   • Primary osteoarthritis of left knee   • Status post total left knee replacement     Past Medical History:   Diagnosis Date   • Arthritis    • History of transfusion    • Osteoarthritis    • PONV (postoperative nausea and vomiting)      Past Surgical History:   Procedure Laterality Date   •  SECTION      X 2   • EYE SURGERY Left    • KNEE SURGERY Left     torn menisus        PT ASSESSMENT (last 12 hours)      Physical Therapy Evaluation     Row Name 10/11/18 1327          PT Evaluation Time/Intention    Subjective Information complains of;pain  -LR     Document Type evaluation  -LR     Mode of Treatment physical therapy;individual therapy  -LR     Patient Effort excellent  -LR     Symptoms Noted During/After Treatment increased pain  -LR     Comment Notified RN.   -LR     Row Name 10/11/18 1327          General Information    Patient Profile Reviewed? yes  -LR     Onset of Illness/Injury or Date of Surgery 10/11/18  -LR     Referring Physician MD Tony  -LR     Patient Observations alert;cooperative;agree to therapy  -LR     Patient/Family Observations Family present.   -LR     General Observations of Patient Patient supine in bed upon arrival. IV, L adductor canal nerve catheter, L knee ace bandaged, exit alarm. Eye patch to R eye.   -LR     Prior Level of Function min assist:;all household mobility;community mobility;gait;transfer;bed mobility;ADL's;home management;cooking;cleaning;shopping;using stairs;independent:;driving   all mobility limited  by pain.   -LR     Equipment Currently Used at Home none  -LR     Pertinent History of Current Functional Problem Patient presents for surgical management of persistent and progressive L knee pain and dysfunction that has failed to improve with conservative management.   -LR     Existing Precautions/Restrictions fall;other (see comments)   L adductor canal nerve catheter  -LR     Equipment Issued to Patient --   none  -LR     Equipment Ordered for Patient --   none  -LR     Risks Reviewed patient and family:;nausea/vomiting;LOB;dizziness;increased discomfort;lines disloged  -LR     Benefits Reviewed patient and family:;improve function;increase independence;increase strength;increase balance;decrease pain;decrease risk of DVT;increase knowledge  -LR     Barriers to Rehab previous functional deficit  -LR     Row Name 10/11/18 1327          Relationship/Environment    Primary Source of Support/Comfort child(subhash);other (see comments)   son, DIL, and grandchildren  -LR     Lives With child(subhash), adult;grandchild(subhash)  -LR     Row Name 10/11/18 1327          Resource/Environmental Concerns    Current Living Arrangements home/apartment/condo  -LR     Resource/Environmental Concerns none  -LR     Row Name 10/11/18 1327          Home Main Entrance    Number of Stairs, Main Entrance one  -LR     Stair Railings, Main Entrance none  -LR     Row Name 10/11/18 1327          Cognitive Assessment/Intervention- PT/OT    Orientation Status (Cognition) oriented x 4  -LR     Follows Commands (Cognition) WFL;follows one step commands;over 90% accuracy;verbal cues/prompting required;repetition of directions required  -LR     Safety Deficit (Cognitive) mild deficit;safety precautions awareness;safety precautions follow-through/compliance  -LR     Row Name 10/11/18 1327          Safety Issues, Functional Mobility    Safety Issues Affecting Function (Mobility) safety precautions follow-through/compliance;safety precaution awareness  -LR      Row Name 10/11/18 1327          Mobility Assessment/Treatment    Extremity Weight-bearing Status left lower extremity  -LR     Left Lower Extremity (Weight-bearing Status) weight-bearing as tolerated (WBAT)  -LR     Row Name 10/11/18 1327          Bed Mobility Assessment/Treatment    Comment (Bed Mobility) Not tested, [facility] on arrival and at end of treatment.   -LR     Row Name 10/11/18 1327          Transfer Assessment/Treatment    Transfer Assessment/Treatment sit-stand transfer;stand-sit transfer;toilet transfer  -LR     Comment (Transfers) Verbal cues to push up from chair to stand and to reach back for chair to lower into sitting. Verbal cues to step L LE out before t/f for comfort. Assisted to and from bathroom. Verbal cues to use grab bar for UE support. Independent with toileting hygiene in sitting. Assisted to sink for hand hygiene.   -LR     Sit-Stand [facility] (Transfers) verbal cues;contact guard;2 person assist  -LR     Stand-Sit [facility] (Transfers) verbal cues;contact guard;2 person assist  -LR     [facility] Level (Toilet Transfer) verbal cues;contact guard  -LR     Assistive Device (Toilet Transfer) walker, front-wheeled  -LR     Row Name 10/11/18 1327          Sit-Stand Transfer    Assistive Device (Sit-Stand Transfers) walker, front-wheeled  -LR     Row Name 10/11/18 132          Stand-Sit Transfer    Assistive Device (Stand-Sit Transfers) walker, front-wheeled  -LR     Row Name 10/11/18 132          Toilet Transfer    Type (Toilet Transfer) sit-stand;stand-sit  -LR     Row Name 10/11/18 132          Gait/Stairs Assessment/Training    77090 - Gait Training Minutes  6  -LR     [facility] Level (Gait) verbal cues;contact guard;2 person assist  -LR     Assistive Device (Gait) walker, front-wheeled  -LR     Distance in Feet (Gait) 130  -LR     Pattern (Gait) step-through  -LR     Deviations/Abnormal Patterns (Gait) left sided deviations;antalgic;bilateral deviations;tobias  decreased;gait speed decreased;stride length decreased  -LR     Bilateral Gait Deviations forward flexed posture;heel strike decreased  -LR     Left Sided Gait Deviations weight shift ability decreased  -LR     Comment (Gait/Stairs) Patient ambulated with step to gait pattern initially. Verbal cues for correct sequencing of steps, to roll RW instead of picking it up and placing it, increased L LE weight bearing/stance phase, and increased L knee flexion during swing phase. Improved with cues for correction, able to progress to step through gait pattern. Gait limited by pain.   -     Row Name 10/11/18 1327          General ROM    RT Lower Ext Comment  -LR     LT Lower Ext Comment  -     Row Name 10/11/18 1327          Right Lower Ext    RT Lower Extremity Comments R LE AROM WFL  -     Row Name 10/11/18 1327          Left Lower Ext    LT Lower Extremity Comments L knee AROM impaired 25%  -     Row Name 10/11/18 1327          MMT (Manual Muscle Testing)    Rt Lower Ext Rt Hip WFL;Rt Knee WFL;Rt Ankle WFL  -LR     Lt Lower Ext Lt Hip WFL;Lt Ankle WFL;Comments  -     Row Name 10/11/18 1327          MMT Left Lower Ext    Lt Lower Extremity Comments  L knee functionally 3-/5, unable to perform SLR, no knee buckling with weight bearing.   -     Row Name 10/11/18 1327          Motor Assessment/Intervention    Additional Documentation Therapeutic Exercise (Group);Therapeutic Exercise Interventions (Group)  -     Row Name 10/11/18 1327          Therapeutic Exercise    28111 - PT Therapeutic Exercise Minutes 2  -     Row Name 10/11/18 1327          Therapeutic Exercise    Lower Extremity (Therapeutic Exercise) gluteal sets;quad sets, left  -LR     Lower Extremity Range of Motion (Therapeutic Exercise) ankle dorsiflexion/plantar flexion, left  -LR     Exercise Type (Therapeutic Exercise) AROM (active range of motion);isometric contraction, static;isotonic contraction, concentric  -LR     Position (Therapeutic  Exercise) seated  -LR     Sets/Reps (Therapeutic Exercise) x10 reps each  -LR     Comment (Therapeutic Exercise) cues for technique; able to actively DF bilaterally  -LR     Row Name 10/11/18 1327          Sensory Assessment/Intervention    Sensory General Assessment no sensation deficits identified;other (see comments)   denies numbness/tingling;able to actively DF bilaterally  -LR     Row Name 10/11/18 1327          Vision Assessment/Intervention    Visual Impairment/Limitations WFL  -LR     Row Name 10/11/18 1327          Pain Assessment    Additional Documentation Pain Scale: Numbers Pre/Post-Treatment (Group)  -LR     Row Name 10/11/18 1327          Pain Scale: Numbers Pre/Post-Treatment    Pain Scale: Numbers, Pretreatment 5/10  -LR     Pain Scale: Numbers, Post-Treatment 7/10  -LR     Pain Location - Side Left  -LR     Pain Location - Orientation anterior  -LR     Pain Location knee  -LR     Pre/Post Treatment Pain Comment into quad  -LR     Pain Intervention(s) Repositioned;Ambulation/increased activity;Medication (See MAR);Cold pack  -LR     Row Name             Wound 10/11/18 0904 Left leg incision    Wound - Properties Group Date first assessed: 10/11/18  -MR Time first assessed: 0904  -MR Side: Left  -MR Location: leg  -MR Type: incision  -MR    Row Name 10/11/18 1327          Coping    Observed Emotional State accepting;cooperative  -LR     Verbalized Emotional State acceptance  -LR     Row Name 10/11/18 1327          Plan of Care Review    Plan of Care Reviewed With patient;family  -LR     Row Name 10/11/18 1327          Physical Therapy Clinical Impression    Date of Referral to PT 10/11/18  -LR     PT Diagnosis (PT Clinical Impression) s/p elective L TKA  -LR     Prognosis (PT Clinical Impression) good  -LR     Patient/Family Goals Statement (PT Clinical Impression) go home, decrease pain  -LR     Criteria for Skilled Interventions Met (PT Clinical Impression) yes;treatment indicated  -LR     Rehab  Potential (PT Clinical Summary) good, to achieve stated therapy goals  -LR     Care Plan Review (PT) evaluation/treatment results reviewed;care plan/treatment goals reviewed;risks/benefits reviewed;current/potential barriers reviewed;patient/other agree to care plan  -LR     Care Plan Review, Other Participant (PT Clinical Impression) family  -LR     Row Name 10/11/18 1327          Physical Therapy Goals    Bed Mobility Goal Selection (PT) bed mobility, PT goal 1  -LR     Transfer Goal Selection (PT) transfer, PT goal 1  -LR     Gait Training Goal Selection (PT) gait training, PT goal 1  -LR     ROM Goal Selection (PT) ROM, PT goal 1  -LR     Stairs Goal Selection (PT) stairs, PT goal 1  -LR     Additional Documentation ROM Goal Selection (PT) (Row);Stairs Goal Selection (PT) (Row)  -LR     Row Name 10/11/18 1327          Bed Mobility Goal 1 (PT)    Activity/Assistive Device (Bed Mobility Goal 1, PT) sit to supine/supine to sit  -LR     [place] Level/Cues Needed (Bed Mobility Goal 1, PT) conditional independence  -LR     Time Frame (Bed Mobility Goal 1, PT) long term goal (LTG);3 days  -LR     Progress/Outcomes (Bed Mobility Goal 1, PT) goal ongoing  -LR     Row Name 10/11/18 1327          Transfer Goal 1 (PT)    Activity/Assistive Device (Transfer Goal 1, PT) sit-to-stand/stand-to-sit;walker, rolling  -LR     [facility] Level/Cues Needed (Transfer Goal 1, PT) conditional independence  -LR     Time Frame (Transfer Goal 1, PT) long term goal (LTG);3 days  -LR     Progress/Outcome (Transfer Goal 1, PT) goal ongoing  -LR     Row Name 10/11/18 1327          Gait Training Goal 1 (PT)    Activity/Assistive Device (Gait Training Goal 1, PT) gait (walking locomotion);walker, rolling  -LR     [place] Level (Gait Training Goal 1, PT) conditional independence  -LR     Distance (Gait Goal 1, PT) 500 feet  -LR     Time Frame (Gait Training Goal 1, PT) long term goal (LTG);3 days  -LR     Progress/Outcome (Gait  Training Goal 1, PT) goal ongoing  -LR     Row Name 10/11/18 1327          ROM Goal 1 (PT)    ROM Goal 1 (PT) 0-90 degrees AAROM L knee  -LR     Time Frame (ROM Goal 1, PT) long term goal (LTG);3 days  -LR     Progress/Outcome (ROM Goal 1, PT) goal ongoing  -LR     Row Name 10/11/18 1327          Stairs Goal 1 (PT)    Activity/Assistive Device (Stairs Goal 1, PT) ascending stairs;descending stairs;step-to-step;walker, rolling  -LR     [place] Level/Cues Needed (Stairs Goal 1, PT) contact guard assist  -LR     Number of Stairs (Stairs Goal 1, PT) 1  -LR     Time Frame (Stairs Goal 1, PT) long term goal (LTG);3 days  -LR     Progress/Outcome (Stairs Goal 1, PT) goal ongoing  -LR     Row Name 10/11/18 1327          Positioning and Restraints    Pre-Treatment Position sitting in chair/recliner  -LR     Post Treatment Position chair  -LR     In Chair notified nsg;reclined;sitting;call light within reach;encouraged to call for assist;exit alarm on;with family/caregiver;legs elevated  -LR     Row Name 10/11/18 1327          Living Environment    Home Accessibility not wheelchair accessible;stairs to enter home;tub/shower is not walk in  -LR       User Key  (r) = Recorded By, (t) = Taken By, (c) = Cosigned By    Initials Name Provider Type    LR Elli Bills, PT Physical Therapist    Isabelle Ann, RN Registered Nurse          Physical Therapy Education     Title: PT OT SLP Therapies (Done)     Topic: Physical Therapy (Done)     Point: Mobility training (Done)    Learning Progress Summary     Learner Status Readiness Method Response Comment Documented by    Patient Done Acceptance EVERO NR Educated on precautions, weight bearing status, correct t/f technique, correct gait mechanics, and progression of POC.  10/11/18 1327    Family Done Acceptance VERO MILLER NR Educated on precautions, weight bearing status, correct t/f technique, correct gait mechanics, and progression of POC.  10/11/18 1327          Point:  Home exercise program (Done)    Learning Progress Summary     Learner Status Readiness Method Response Comment Documented by    Patient Done Acceptance E,D VU,NR Educated on precautions, weight bearing status, correct t/f technique, correct gait mechanics, and progression of POC. LR 10/11/18 1327    Family Done Acceptance E,D VU,NR Educated on precautions, weight bearing status, correct t/f technique, correct gait mechanics, and progression of POC. LR 10/11/18 1327          Point: Body mechanics (Done)    Learning Progress Summary     Learner Status Readiness Method Response Comment Documented by    Patient Done Acceptance E,D VU,NR Educated on precautions, weight bearing status, correct t/f technique, correct gait mechanics, and progression of POC. LR 10/11/18 1327    Family Done Acceptance E,D VU,NR Educated on precautions, weight bearing status, correct t/f technique, correct gait mechanics, and progression of POC. LR 10/11/18 1327          Point: Precautions (Done)    Learning Progress Summary     Learner Status Readiness Method Response Comment Documented by    Patient Done Acceptance E,D VU,NR Educated on precautions, weight bearing status, correct t/f technique, correct gait mechanics, and progression of POC. LR 10/11/18 1327    Family Done Acceptance E,D VU,NR Educated on precautions, weight bearing status, correct t/f technique, correct gait mechanics, and progression of POC. LR 10/11/18 1327                      User Key     Initials Effective Dates Name Provider Type Discipline    LR 06/19/15 -  Elli Bills, PT Physical Therapist PT                PT Recommendation and Plan  Anticipated Discharge Disposition (PT): home with assist, home with home health  Planned Therapy Interventions (PT Eval): balance training, bed mobility training, gait training, home exercise program, patient/family education, ROM (range of motion), stair training, strengthening, transfer training  Outcome Summary/Treatment  Plan (PT)  Anticipated Equipment Needs at Discharge (PT): front wheeled walker, standard cane, shower chair, bedside commode  Anticipated Discharge Disposition (PT): home with assist, home with home health  Plan of Care Reviewed With: patient, family  Progress: improving  Outcome Summary: Patient ambulated 130 feet with RW, limited by pain. ROM to be initiated POD#1. Plan is d/c home with family and HHPT tomorrow. Will continue to progress as able. PADD score of 8.           Outcome Measures     Row Name 10/11/18 1327             How much help from another person do you currently need...    Turning from your back to your side while in flat bed without using bedrails? 3  -LR      Moving from lying on back to sitting on the side of a flat bed without bedrails? 3  -LR      Moving to and from a bed to a chair (including a wheelchair)? 3  -LR      Standing up from a chair using your arms (e.g., wheelchair, bedside chair)? 3  -LR      Climbing 3-5 steps with a railing? 3  -LR      To walk in hospital room? 3  -LR      AM-PAC 6 Clicks Score 18  -LR         Functional Assessment    Outcome Measure Options AM-PAC 6 Clicks Basic Mobility (PT)  -LR        User Key  (r) = Recorded By, (t) = Taken By, (c) = Cosigned By    Initials Name Provider Type    Elli Ulloa, PT Physical Therapist           Time Calculation:         PT Charges     Row Name 10/11/18 1327             Time Calculation    Start Time 1327  -LR      PT Received On 10/11/18  -LR      PT Goal Re-Cert Due Date 10/21/18  -LR         Time Calculation- PT    Total Timed Code Minutes- PT 8 minute(s)  -LR         Timed Charges    13139 - PT Therapeutic Exercise Minutes 2  -LR      66160 - Gait Training Minutes  6  -LR        User Key  (r) = Recorded By, (t) = Taken By, (c) = Cosigned By    Initials Name Provider Type    Elli Ulloa, PT Physical Therapist        Therapy Suggested Charges     Code   Minutes Charges    32822 (CPT®) Hc Pt  Neuromusc Re Education Ea 15 Min      24404 (CPT®) Hc Pt Ther Proc Ea 15 Min 2     56947 (CPT®) Hc Gait Training Ea 15 Min 6 1    20107 (CPT®) Hc Pt Therapeutic Act Ea 15 Min      38364 (CPT®) Hc Pt Manual Therapy Ea 15 Min      72422 (CPT®) Hc Pt Iontophoresis Ea 15 Min      65508 (CPT®) Hc Pt Elec Stim Ea-Per 15 Min      26630 (CPT®) Hc Pt Ultrasound Ea 15 Min      28460 (CPT®) Hc Pt Self Care/Mgmt/Train Ea 15 Min      66187 (CPT®) Hc Pt Prosthetic (S) Train Initial Encounter, Each 15 Min      01125 (CPT®) Hc Pt Orthotic(S)/Prosthetic(S) Encounter, Each 15 Min      01534 (CPT®) Hc Orthotic(S) Mgmt/Train Initial Encounter, Each 15min      Total  8 1        Therapy Charges for Today     Code Description Service Date Service Provider Modifiers Qty    12916052811 HC PT MOBILITY CURRENT 10/11/2018 Elli Bills, PT GP, CK 1    89408932188 HC PT MOBILITY PROJECTED 10/11/2018 Elli Bills, PT GP, CI 1    95870638358 HC GAIT TRAINING EA 15 MIN 10/11/2018 Elli Bills, PT GP 1    67234341048 HC PT THER SUPP EA 15 MIN 10/11/2018 Elli Bills, PT GP 2    34148543241 HC PT EVAL LOW COMPLEXITY 3 10/11/2018 Elli Bills, PT GP 1          PT G-Codes  PT Professional Judgement Used?: Yes  Outcome Measure Options: AM-PAC 6 Clicks Basic Mobility (PT)  AM-PAC 6 Clicks Score: 18  Functional Limitation: Mobility: Walking and moving around  Mobility: Walking and Moving Around Current Status (): At least 40 percent but less than 60 percent impaired, limited or restricted  Mobility: Walking and Moving Around Goal Status (): At least 1 percent but less than 20 percent impaired, limited or restricted      Elli Bills, PT  10/11/2018

## 2018-10-11 NOTE — OP NOTE
Preoperative diagnosis:Left knee end stage osteoarthritis    Postoperative diagnosis: Left knee end stage osteoarthritis    Operative procedure: Left total knee arthroplasty    Surgeon: Dr. Tesfaye Jimenez    Assistant: IAIN Molina.  Necessary during the case for positioning of the patient, exposure, implantation of components and closure.    Anesthesia: Spinal with local and adductor canal catheter    Estimated blood loss: Minimal    Implants: [employer]  posterior stabilized total knee arthroplasty size 6 femur, 4 tibia, 29 patella, 9 poly    Tourniquet time: 82 minutes at 300 mmHg    Indications for procedure: Evelyn is a very pleasant 63-year-old female who has struggled with end-stage activity limiting left knee pain secondary to osteoarthritis.  X-rays in January revealed severe tricompartmental degenerative changes in a varus knee with complete loss of medial joint space and marginal osteophyte formation.  They have failed exhaustive conservative treatment measures including cortisone injections in April and physical therapy at [facility].  She was not interested in Visco supplementation injections.  She had a left knee arthroscopy by Dr. Michael in 2017.  After undergoing a shared decision-making process they agreed to proceed with left total knee arthroplasty.  Surgical consent form was signed.    Description of procedure: The patient was seen in the preoperative holding area and the left leg was signed to confirm the correct operative site.  They were seen by anesthesia.  They received Clindamycin 900mg IV prophylactic antibiotics within 1 hour of incision time.  They were brought back to the operating room.  Spinal was performed without difficulty and they were given sedation throughout the case.  Patient placed in the supine position and all bony prominences were well-padded.  A bump was placed underneath the left hip.  Nonsterile tourniquet was applied to the thigh.  The left lower extremity  was prepped and draped in the usual sterile fashion and timeout was performed to confirm left total knee arthroplasty for patient Evelyn Medina.    The left lower extremity was exsanguinated with an Esmarch.  Tourniquet was inflated to 300 mmHg.  With the knee flexed a midline incision was made with a 10 blade scalpel.  Adequate hemostasis maintained with Bovie electrocautery.  Full-thickness medial and lateral flaps were elevated.  Using a fresh 10 blade scalpel I made a medial parapatellar arthrotomy.  The patella was everted.  Patella fat pad and anterior femoral fat pads were excised.  Marginal osteophytes were removed.  Medial release was performed for this varus knee using Bovie electrocautery.  Ivan's line was marked.  Z retractors were placed medially and laterally.  The distal femur was then drilled and intramedullary distal femoral cutting guide was pinned in place set at a 5° valgus cut for a 9.5 mm cut.  This cut was then made with the oscillating saw.  The femur was then sized to a size 6 set at 3° of external rotation.  4-in-1 cutting guide was pinned in place.  Anterior and posterior cuts were made as were the chamfer cuts.  Cut bone was removed.  We then turned our attention to the tibia.    The tibia was subluxed anteriorly. PCL retractor was placed as were medial and lateral Hohmann retractors.  We then used the extramedullary tibial cutting guide set at 3° posterior slope for the proximal tibial cut.  5 mm of bone was removed from the low medial side and a centimeter from the high lateral side.  Medial and lateral menisci were then excised as were posterior osteophytes.  Flexion and extension gaps were then checked and with a 9 mm block we achieved full extension and flexion with excellent alignment. The tibia was sized to a 4 tibial tray centered off the medial third of the tibial tubercle.  The tray was pinned in place.  We then impacted the tibial fins.  Size 6  trial femur was then  placed and box cut was made with the reamer and punch. We trialed with a size 9 poly, 6 femur and 4 tibia.  With these trial components in place we achieved full extension and flexion with excellent alignment and stable throughout.     We then turned our attention to the patella.  Patella was sized to 20 mm in thickness and we made a 7 mm patellar cut with the reciprocal saw.  A 29 trial was placed and the patella drill holes were made.  With the trial button in place there was excellent tracking with the no thumbs technique.    Trial components were then removed.  Final components were opened on the back table.  Posterior capsule was injected with 20 mL of half percent ropivacaine and 5 mg of morphine.  Cement was mixed on the back table.  The knee was copiously irrigated with Pulsavac lavage.  The knee was thoroughly dried and a bone plug was placed in the distal femoral drill hole.  Cement was then applied to the tibia and final size 4 tibial tray was impacted in place and excess cement was removed.  Cement was applied to the femur and final size 6 femur was impacted in place and excess cement removed.  We then placed a 9 mm poly-this was seen to be fully seated in the tibial tray.  Knee was then placed in extension and we applied cement to the cut patellar surface and 29 thin patella was held in place with patellar clamp.  All excess cement was removed.  The knee was left in extension while cement cured.    Once the cement was fully cured we irrigated the knee with diluted Betadine solution and Pulsavac lavage.  The knee was taken through full range of motion and seen to achieve full extension and flexion with excellent patellar tracking.    We then proceeded with closure.  The deep fascia was closed with a #1 Stratafix barbed suture.  Dermis was closed with 2-0 Vicryl.  Skin was closed with a running 3-0 Stratafix barbed subcuticular suture.  A sterile dressing was then applied with Prineo mesh dressing and  Dermabond.  We then applied 4 x 4's, ABDs, soft roll and a six-inch Ace bandage.    Tourniquet was deflated at 82 minutes.  Patient was transferred to recovery in stable condition.  All sponge and needle counts were correct ×2.  Patient received first dose of tranexamic acid just prior to incision and the second dose 5-10 minutes prior to letting down the tourniquet to minimize bleeding.    Postoperative plan:  Patient will be admitted to Dr. LEO for medical management  Mobilize starting today with PT/OT as tolerated  Start aspirin for DVT prophylaxis tomorrow   consult for home physical therapy and home equipment needs. Plan outpatient therapy at [facility]  Follow-up with me in 2-3 weeks.    Dewey Jimenez MD  10/11/18  9:35 AM

## 2018-10-12 VITALS
HEART RATE: 80 BPM | RESPIRATION RATE: 18 BRPM | SYSTOLIC BLOOD PRESSURE: 113 MMHG | BODY MASS INDEX: 32.77 KG/M2 | WEIGHT: 203.93 LBS | OXYGEN SATURATION: 91 % | TEMPERATURE: 99.1 F | HEIGHT: 66 IN | DIASTOLIC BLOOD PRESSURE: 53 MMHG

## 2018-10-12 PROBLEM — D62 ACUTE BLOOD LOSS ANEMIA: Status: ACTIVE | Noted: 2018-10-12

## 2018-10-12 PROBLEM — G89.18 ACUTE POSTOPERATIVE PAIN: Status: ACTIVE | Noted: 2018-10-12

## 2018-10-12 LAB
ANION GAP SERPL CALCULATED.3IONS-SCNC: 5 MMOL/L (ref 3–11)
BASOPHILS # BLD AUTO: 0.03 10*3/MM3 (ref 0–0.2)
BASOPHILS NFR BLD AUTO: 0.4 % (ref 0–1)
BUN BLD-MCNC: 14 MG/DL (ref 9–23)
BUN/CREAT SERPL: 22.6 (ref 7–25)
CALCIUM SPEC-SCNC: 8.5 MG/DL (ref 8.7–10.4)
CHLORIDE SERPL-SCNC: 104 MMOL/L (ref 99–109)
CO2 SERPL-SCNC: 25 MMOL/L (ref 20–31)
CREAT BLD-MCNC: 0.62 MG/DL (ref 0.6–1.3)
DEPRECATED RDW RBC AUTO: 40.7 FL (ref 37–54)
EOSINOPHIL # BLD AUTO: 0.14 10*3/MM3 (ref 0–0.3)
EOSINOPHIL NFR BLD AUTO: 1.7 % (ref 0–3)
ERYTHROCYTE [DISTWIDTH] IN BLOOD BY AUTOMATED COUNT: 12.8 % (ref 11.3–14.5)
GFR SERPL CREATININE-BSD FRML MDRD: 98 ML/MIN/1.73
GLUCOSE BLD-MCNC: 113 MG/DL (ref 70–100)
HCT VFR BLD AUTO: 35.6 % (ref 34.5–44)
HGB BLD-MCNC: 11.3 G/DL (ref 11.5–15.5)
IMM GRANULOCYTES # BLD: 0.01 10*3/MM3 (ref 0–0.03)
IMM GRANULOCYTES NFR BLD: 0.1 % (ref 0–0.6)
LYMPHOCYTES # BLD AUTO: 1.39 10*3/MM3 (ref 0.6–4.8)
LYMPHOCYTES NFR BLD AUTO: 17 % (ref 24–44)
MCH RBC QN AUTO: 27.6 PG (ref 27–31)
MCHC RBC AUTO-ENTMCNC: 31.7 G/DL (ref 32–36)
MCV RBC AUTO: 86.8 FL (ref 80–99)
MONOCYTES # BLD AUTO: 1.13 10*3/MM3 (ref 0–1)
MONOCYTES NFR BLD AUTO: 13.8 % (ref 0–12)
NEUTROPHILS # BLD AUTO: 5.49 10*3/MM3 (ref 1.5–8.3)
NEUTROPHILS NFR BLD AUTO: 67.1 % (ref 41–71)
PLATELET # BLD AUTO: 172 10*3/MM3 (ref 150–450)
PMV BLD AUTO: 10.8 FL (ref 6–12)
POTASSIUM BLD-SCNC: 3.6 MMOL/L (ref 3.5–5.5)
RBC # BLD AUTO: 4.1 10*6/MM3 (ref 3.89–5.14)
SODIUM BLD-SCNC: 134 MMOL/L (ref 132–146)
WBC NRBC COR # BLD: 8.18 10*3/MM3 (ref 3.5–10.8)

## 2018-10-12 PROCEDURE — 97165 OT EVAL LOW COMPLEX 30 MIN: CPT

## 2018-10-12 PROCEDURE — G8979 MOBILITY GOAL STATUS: HCPCS

## 2018-10-12 PROCEDURE — 80048 BASIC METABOLIC PNL TOTAL CA: CPT | Performed by: ORTHOPAEDIC SURGERY

## 2018-10-12 PROCEDURE — 97116 GAIT TRAINING THERAPY: CPT

## 2018-10-12 PROCEDURE — 85025 COMPLETE CBC W/AUTO DIFF WBC: CPT | Performed by: ORTHOPAEDIC SURGERY

## 2018-10-12 PROCEDURE — G8980 MOBILITY D/C STATUS: HCPCS

## 2018-10-12 PROCEDURE — G8978 MOBILITY CURRENT STATUS: HCPCS

## 2018-10-12 PROCEDURE — 97110 THERAPEUTIC EXERCISES: CPT

## 2018-10-12 PROCEDURE — 97535 SELF CARE MNGMENT TRAINING: CPT

## 2018-10-12 RX ORDER — PSEUDOEPHEDRINE HCL 30 MG
100 TABLET ORAL 2 TIMES DAILY PRN
Qty: 60 EACH | Refills: 0 | Status: SHIPPED | OUTPATIENT
Start: 2018-10-12 | End: 2018-10-12

## 2018-10-12 RX ORDER — HYDROCODONE BITARTRATE AND ACETAMINOPHEN 7.5; 325 MG/1; MG/1
1 TABLET ORAL EVERY 4 HOURS PRN
Qty: 40 TABLET | Refills: 0 | Status: SHIPPED | OUTPATIENT
Start: 2018-10-12 | End: 2018-10-21

## 2018-10-12 RX ORDER — PSEUDOEPHEDRINE HCL 30 MG
100 TABLET ORAL 2 TIMES DAILY PRN
Qty: 60 EACH | Refills: 0 | Status: SHIPPED | OUTPATIENT
Start: 2018-10-12

## 2018-10-12 RX ADMIN — MELOXICAM 15 MG: 7.5 TABLET ORAL at 09:11

## 2018-10-12 RX ADMIN — HYDROCODONE BITARTRATE AND ACETAMINOPHEN 2 TABLET: 7.5; 325 TABLET ORAL at 06:03

## 2018-10-12 RX ADMIN — HYDROCODONE BITARTRATE AND ACETAMINOPHEN 2 TABLET: 7.5; 325 TABLET ORAL at 01:13

## 2018-10-12 RX ADMIN — CLINDAMYCIN PHOSPHATE 900 MG: 900 INJECTION, SOLUTION INTRAVENOUS at 01:13

## 2018-10-12 RX ADMIN — ASPIRIN 325 MG: 325 TABLET, DELAYED RELEASE ORAL at 09:11

## 2018-10-12 RX ADMIN — HYDROCODONE BITARTRATE AND ACETAMINOPHEN 2 TABLET: 7.5; 325 TABLET ORAL at 10:10

## 2018-10-12 NOTE — PROGRESS NOTES
[facility]    Acute pain service Inpatient Progress Note    Patient Name: Evelyn Medina  :  1955  MRN:  [id number]        Acute Pain  Service Inpatient Progress Note:    Analgesia:Excellent  Pain Score:2/10  LOC: alert and awake  Resp Status: room air  Cardiac: VS stable  Side Effects:None  Catheter Site:clean, dry and dressing intact  Cath type: peripheral nerve cath with ON Q  Infusion rate: 10ml/hr  Catheter Plan:Catheter to remain Insitu, Continue catheter infusion rate unchanged and Patient to be discharged home

## 2018-10-12 NOTE — THERAPY DISCHARGE NOTE
Acute Care - Physical Therapy Treatment Note/Discharge  [facility]     Patient Name: Evelny Medina  : 1955  MRN: [id number]  Today's Date: 10/12/2018  Onset of Illness/Injury or Date of Surgery: 10/11/18  Date of Referral to PT: 10/11/18  Referring Physician: MD Tony    Admit Date: 10/11/2018    Visit Dx:    ICD-10-CM ICD-9-CM   1. Impaired functional mobility, balance, gait, and endurance Z74.09 V49.89     Patient Active Problem List   Diagnosis   • Primary osteoarthritis of left knee   • Status post total left knee replacement       Physical Therapy Education     Title: PT OT SLP Therapies (Done)     Topic: Physical Therapy (Done)     Point: Mobility training (Done)    Learning Progress Summary     Learner Status Readiness Method Response Comment Documented by    Patient Done Acceptance E,D,H VU Reviewed HEP, gait mechanics, stair sequencing, car transfer, benefits of mobility. Issued HEP handout  10/12/18 0820     Done Acceptance E,D VU,NR Educated on precautions, weight bearing status, correct t/f technique, correct gait mechanics, and progression of POC. LR 10/11/18 1327    Family Done Acceptance E,D,H VU Reviewed HEP, gait mechanics, stair sequencing, car transfer, benefits of mobility. Issued HEP handout  10/12/18 0820     Done Acceptance E,D VU,NR Educated on precautions, weight bearing status, correct t/f technique, correct gait mechanics, and progression of POC. LR 10/11/18 1327          Point: Home exercise program (Done)    Learning Progress Summary     Learner Status Readiness Method Response Comment Documented by    Patient Done Acceptance E,D,H VU Reviewed HEP, gait mechanics, stair sequencing, car transfer, benefits of mobility. Issued HEP handout  10/12/18 0820     Done Acceptance E,D VU,NR Educated on precautions, weight bearing status, correct t/f technique, correct gait mechanics, and progression of POC. LR 10/11/18 1327    Family Done Acceptance E,D,H VU Reviewed HEP, gait  mechanics, stair sequencing, car transfer, benefits of mobility. Issued HEP handout  10/12/18 0820     Done Acceptance E,D VU,NR Educated on precautions, weight bearing status, correct t/f technique, correct gait mechanics, and progression of POC. LR 10/11/18 1327          Point: Body mechanics (Done)    Learning Progress Summary     Learner Status Readiness Method Response Comment Documented by    Patient Done Acceptance E,D,H VU Reviewed HEP, gait mechanics, stair sequencing, car transfer, benefits of mobility. Issued HEP handout  10/12/18 0820     Done Acceptance E,D VU,NR Educated on precautions, weight bearing status, correct t/f technique, correct gait mechanics, and progression of POC. LR 10/11/18 1327    Family Done Acceptance E,D,H VU Reviewed HEP, gait mechanics, stair sequencing, car transfer, benefits of mobility. Issued HEP handout  10/12/18 0820     Done Acceptance E,D VU,NR Educated on precautions, weight bearing status, correct t/f technique, correct gait mechanics, and progression of POC. LR 10/11/18 1327          Point: Precautions (Done)    Learning Progress Summary     Learner Status Readiness Method Response Comment Documented by    Patient Done Acceptance E,D,H VU Reviewed HEP, gait mechanics, stair sequencing, car transfer, benefits of mobility. Issued HEP handout  10/12/18 0820     Done Acceptance E,D VU,NR Educated on precautions, weight bearing status, correct t/f technique, correct gait mechanics, and progression of POC. LR 10/11/18 1327    Family Done Acceptance E,D,H VU Reviewed HEP, gait mechanics, stair sequencing, car transfer, benefits of mobility. Issued HEP handout  10/12/18 0820     Done Acceptance E,D VU,NR Educated on precautions, weight bearing status, correct t/f technique, correct gait mechanics, and progression of POC. LR 10/11/18 1327                      User Key     Initials Effective Dates Name Provider Type Discipline    LR 06/19/15 -  Elli Bills,  PT Physical Therapist PT    MJ 04/03/18 -  Rom Alaniz, PT Physical Therapist PT                    PT Rehab Goals     Row Name 10/12/18 0820             Bed Mobility Goal 1 (PT)    Activity/Assistive Device (Bed Mobility Goal 1, PT) sit to supine/supine to sit  -MJ      [place] Level/Cues Needed (Bed Mobility Goal 1, PT) conditional independence  -MJ      Time Frame (Bed Mobility Goal 1, PT) long term goal (LTG);3 days  -MJ      Progress/Outcomes (Bed Mobility Goal 1, PT) goal not met;discharged from facility  -MJ         Transfer Goal 1 (PT)    Activity/Assistive Device (Transfer Goal 1, PT) sit-to-stand/stand-to-sit;walker, rolling  -MJ      [place] Level/Cues Needed (Transfer Goal 1, PT) conditional independence  -MJ      Time Frame (Transfer Goal 1, PT) long term goal (LTG);3 days  -MJ      Progress/Outcome (Transfer Goal 1, PT) goal not met;discharged from facility  -MJ         Gait Training Goal 1 (PT)    Activity/Assistive Device (Gait Training Goal 1, PT) gait (walking locomotion);walker, rolling  -MJ      [place] (Gait Training Goal 1, PT) conditional independence  -MJ      Distance (Gait Goal 1, PT) 500 feet  -MJ      Time Frame (Gait Training Goal 1, PT) long term goal (LTG);3 days  -MJ      Progress/Outcome (Gait Training Goal 1, PT) goal not met;discharged from facility  -MJ         ROM Goal 1 (PT)    ROM Goal 1 (PT) 0-90 degrees AAROM L knee  -MJ      Time Frame (ROM Goal 1, PT) long term goal (LTG);3 days  -MJ      Progress/Outcome (ROM Goal 1, PT) goal partially met   achieved flexion  -MJ         Stairs Goal 1 (PT)    Activity/Assistive Device (Stairs Goal 1, PT) ascending stairs;descending stairs;step-to-step;walker, rolling  -MJ      [place] Level/Cues Needed (Stairs Goal 1, PT) contact guard assist  -MJ      Number of Stairs (Stairs Goal 1, PT) 1  -MJ      Time Frame (Stairs Goal 1, PT) long term goal (LTG);3 days  -MJ      Progress/Outcome (Stairs Goal 1, PT) goal met   -MJ        User Key  (r) = Recorded By, (t) = Taken By, (c) = Cosigned By    Initials Name Provider Type Discipline     Rom Alaniz, PT Physical Therapist PT        Therapy Treatment        Rehabilitation Treatment Summary     Row Name 10/12/18 0820             Treatment Time/Intention    Discipline physical therapist  -MJ      Document Type therapy note (daily note);discharge treatment  -MJ      Subjective Information complains of;pain  -MJ      Mode of Treatment physical therapy  -MJ      Patient/Family Observations Pt supine in bed, L adductor nerve catheter, IV. Family present  -      Care Plan Review patient/other agree to care plan  -MJ      Patient Effort excellent  -MJ      Existing Precautions/Restrictions fall;other (see comments)   L adductor nerve catheter  -MJ      Recorded by [MJ] Rom Alaniz, PT 10/12/18 0852      Row Name 10/12/18 0820             Cognitive Assessment/Intervention- PT/OT    Orientation Status (Cognition) oriented x 4  -MJ      Follows Commands (Cognition) WFL  -MJ      Recorded by [MJ] Rom Alaniz, PT 10/12/18 0852      Row Name 10/12/18 0820             Safety Issues, Functional Mobility    Impairments Affecting Function (Mobility) pain;strength  -MJ      Recorded by [MJ] Rom Alaniz, PT 10/12/18 0852      Row Name 10/12/18 0820             Mobility Assessment/Intervention    Extremity Weight-bearing Status left lower extremity  -MJ      Left Lower Extremity (Weight-bearing Status) weight-bearing as tolerated (WBAT)  -MJ      Recorded by [MJ] Rom Alaniz, PT 10/12/18 0852      Row Name 10/12/18 0820             Bed Mobility Assessment/Treatment    Bed Mobility Assessment/Treatment supine-sit  -MJ      Supine-Sit [place] (Bed Mobility) minimum assist (75% patient effort);verbal cues  -MJ      Bed Mobility, Safety Issues decreased use of legs for bridging/pushing  -MJ      Assistive Device (Bed Mobility) bed rails;head of bed elevated  -      Comment (Bed Mobility)  Verbal cues to move LEs off EOB, assist required with L LE.   -MJ      Recorded by [MJ] Rom Alaniz, PT 10/12/18 0852      Row Name 10/12/18 0820             Transfer Assessment/Treatment    Transfer Assessment/Treatment sit-stand transfer;stand-sit transfer;toilet transfer  -MJ      Comment (Transfers) Verbal cues for correct hand placement and to step L LE out prior to t/f. Assisted pt to bathroom, cues to reach back for grab bar prior to t/f. Pt independent with hygiene after toileting  -MJ      Recorded by [MJ] Rom Alaniz, PT 10/12/18 0852      Row Name 10/12/18 0820             Sit-Stand Transfer    Sit-Stand [place] (Transfers) contact guard;verbal cues  -MJ      Assistive Device (Sit-Stand Transfers) walker, front-wheeled  -MJ      Recorded by [MJ] Rom Alaniz, PT 10/12/18 0852      Row Name 10/12/18 0820             Stand-Sit Transfer    Stand-Sit [place] (Transfers) contact guard;verbal cues  -MJ      Assistive Device (Stand-Sit Transfers) walker, front-wheeled  -MJ      Recorded by [MJ] Rom Alaniz, PT 10/12/18 0852      Row Name 10/12/18 0820             Toilet Transfer    Type (Toilet Transfer) sit-stand;stand-sit  -MJ      [place] Level (Toilet Transfer) contact guard;verbal cues  -MJ      Assistive Device (Toilet Transfer) walker, front-wheeled  -MJ      Recorded by [MJ] Rom Alaniz, PT 10/12/18 0852      Row Name 10/12/18 0820             Gait/Stairs Assessment/Training    96544 - Gait Training Minutes  10  -MJ      [place] Level (Gait) contact guard;verbal cues  -MJ      Assistive Device (Gait) walker, front-wheeled  -MJ      Distance in Feet (Gait) 300  -MJ      Pattern (Gait) step-through  -MJ      Deviations/Abnormal Patterns (Gait) left sided deviations;antalgic;tobias decreased;stride length decreased  -MJ      Bilateral Gait Deviations forward flexed posture;heel strike decreased;weight shift ability decreased  -MJ      [place] Level (Stairs) contact guard;verbal  cues  -MJ      Assistive Device (Stairs) walker, front-wheeled  -MJ      Handrail Location (Stairs) none  -MJ      Number of Steps (Stairs) 1  -MJ      Ascending Technique (Stairs) step-to-step  -MJ      Descending Technique (Stairs) step-to-step  -MJ      Stairs, Safety Issues sequencing ability decreased;weight-shifting ability decreased  -MJ      Stairs, Impairments ROM decreased;strength decreased;pain  -MJ      Comment (Gait/Stairs) Pt demo step through gait pattern at slow pace. Verbal cues for increased LE weight bearing, improvement noted. Pt with good heel strike throughout and gait sequencing became smoother with increased distance. Pt performed 1 step backward with RW. Cues to ascend with R LE and to descend forward with L LE.   -MJ      Recorded by [MJ] Rom Alaniz, PT 10/12/18 0852      Row Name 10/12/18 0820             Left Lower Ext    LT Lower Extremity Comments L knee 9-91 degrees; pt lacking 9 degrees from full extension; pt seated to measure AAROM flexion  -MJ      Recorded by [MJ] Rom Alaniz, PT 10/12/18 0852      Row Name 10/12/18 0820             Motor Skills Assessment/Interventions    Additional Documentation Therapeutic Exercise (Group)  -MJ      Recorded by [MJ] Rom Alaniz, PT 10/12/18 0852      Row Name 10/12/18 0820             Therapeutic Exercise    16350 - PT Therapeutic Exercise Minutes 9  -MJ      06654 - PT Therapeutic Activity Minutes 4  -MJ      Recorded by [MJ] Rom Alaniz, PT 10/12/18 0852      Row Name 10/12/18 0820             Therapeutic Exercise    Lower Extremity (Therapeutic Exercise) gluteal sets;heel slides, left;LAQ (long arc quad), left;quad sets, left;SAQ (short arc quad), left;SLR (straight leg raise), left  -MJ      Lower Extremity Range of Motion (Therapeutic Exercise) ankle dorsiflexion/plantar flexion, left  -MJ      Exercise Type (Therapeutic Exercise) AAROM (active assistive range of motion)  -MJ      Position (Therapeutic Exercise) seated  -MJ       Sets/Reps (Therapeutic Exercise) 15x each  -MJ      Comment (Therapeutic Exercise) Cues for technique. Randy w/ SLR  -MJ      Recorded by [MJ] Rom Alaniz, PT 10/12/18 0852      Row Name 10/12/18 0820             Positioning and Restraints    Pre-Treatment Position in bed  -MJ      Post Treatment Position chair  -MJ      In Chair notified nsg;reclined;call light within reach;encouraged to call for assist;with family/caregiver  -MJ      Recorded by [MJ] Rom Alaniz, PT 10/12/18 0852      Row Name 10/12/18 0820             Pain Scale: Numbers Pre/Post-Treatment    Pain Scale: Numbers, Pretreatment 2/10  -MJ      Pain Scale: Numbers, Post-Treatment 6/10  -MJ      Pain Location - Side Left  -MJ      Pain Location - Orientation generalized  -MJ      Pain Location knee  -MJ      Pain Intervention(s) Repositioned;Ambulation/increased activity;Cold pack  -MJ      Recorded by [MJ] Rom Alaniz, PT 10/12/18 0852      Row Name                Wound 10/11/18 0904 Left leg incision    Wound - Properties Group Date first assessed: 10/11/18 [MR] Time first assessed: 0904 [MR] Side: Left [MR] Location: leg [MR] Type: incision [MR] Recorded by:  [MR] Isabelle Hidalgo RN 10/11/18 0904    Row Name 10/12/18 0820             Plan of Care Review    Plan of Care Reviewed With patient;family  -MJ      Recorded by [MJ] Rom Alaniz, PT 10/12/18 0852      Row Name 10/12/18 0820             Outcome Summary/Treatment Plan (PT)    Daily Summary of Progress (PT) progress toward functional goals is good  -MJ      Recorded by [MJ] Rom Alaniz, PT 10/12/18 0852        User Key  (r) = Recorded By, (t) = Taken By, (c) = Cosigned By    Initials Name Effective Dates Discipline     Rom Alaniz, PT 04/03/18 -  PT    MR Isabelle Hidalgo RN - Nurse        Wound 10/11/18 0904 Left leg incision (Active)   Dressing Appearance no drainage;intact;dry 10/12/2018  5:48 AM       PT Recommendation and Plan  Therapy Frequency (PT Clinical Impression): 2  times/day  Outcome Summary/Treatment Plan (PT)  Daily Summary of Progress (PT): progress toward functional goals is good  Plan of Care Reviewed With: patient, family  Progress: improving  Outcome Summary: Pt increased gait distance to 300 feet with CGA and RW, limited by fatigue. Pt progressed to stair training x1 with RW to mimic home environment and ROM measured 9-91 degrees. Pt is discharging home today with outpatient PT, made good progress toward goals during inpatient stay          Outcome Measures     Row Name 10/12/18 0820 10/11/18 1327          How much help from another person do you currently need...    Turning from your back to your side while in flat bed without using bedrails? 3  -MJ 3  -LR     Moving from lying on back to sitting on the side of a flat bed without bedrails? 3  -MJ 3  -LR     Moving to and from a bed to a chair (including a wheelchair)? 3  -MJ 3  -LR     Standing up from a chair using your arms (e.g., wheelchair, bedside chair)? 3  -MJ 3  -LR     Climbing 3-5 steps with a railing? 3  -MJ 3  -LR     To walk in hospital room? 3  -MJ 3  -LR     AM-PAC 6 Clicks Score 18  -MJ 18  -LR        Functional Assessment    Outcome Measure Options AM-PAC 6 Clicks Basic Mobility (PT)  -MJ AM-PAC 6 Clicks Basic Mobility (PT)  -LR       User Key  (r) = Recorded By, (t) = Taken By, (c) = Cosigned By    Initials Name Provider Type    LR Elli Bills, PT Physical Therapist    Rom Nice, PT Physical Therapist           Time Calculation:         PT Charges     Row Name 10/12/18 0820             Time Calculation    Start Time 0820  -MJ      PT Received On 10/12/18  -MJ      PT Goal Re-Cert Due Date 10/21/18  -MJ         Time Calculation- PT    Total Timed Code Minutes- PT 23 minute(s)  -MJ         Timed Charges    68102 - PT Therapeutic Exercise Minutes 9  -MJ      56650 - Gait Training Minutes  10  -MJ      76466 - PT Therapeutic Activity Minutes 4  -MJ        User Key  (r) = Recorded By, (t)  = Taken By, (c) = Cosigned By    Initials Name Provider Type    MJ Rom Alaniz, PT Physical Therapist        Therapy Suggested Charges     Code   Minutes Charges    11846 (CPT®) Hc Pt Neuromusc Re Education Ea 15 Min      87324 (CPT®) Hc Pt Ther Proc Ea 15 Min 9 1    10504 (CPT®) Hc Gait Training Ea 15 Min 10 1    42037 (CPT®) Hc Pt Therapeutic Act Ea 15 Min 4     07956 (CPT®) Hc Pt Manual Therapy Ea 15 Min      49329 (CPT®) Hc Pt Iontophoresis Ea 15 Min      22187 (CPT®) Hc Pt Elec Stim Ea-Per 15 Min      91465 (CPT®) Hc Pt Ultrasound Ea 15 Min      16676 (CPT®) Hc Pt Self Care/Mgmt/Train Ea 15 Min      29612 (CPT®) Hc Pt Prosthetic (S) Train Initial Encounter, Each 15 Min      65093 (CPT®) Hc Pt Orthotic(S)/Prosthetic(S) Encounter, Each 15 Min      85468 (CPT®) Hc Orthotic(S) Mgmt/Train Initial Encounter, Each 15min      Total  23 2          Therapy Charges for Today     Code Description Service Date Service Provider Modifiers Qty    75277436110 HC PT MOBILITY CURRENT 10/12/2018 Rom Alaniz, PT GP, CK 1    28668658880 HC PT MOBILITY PROJECTED 10/12/2018 Rom Alaniz, PT GP, CI 1    71264668367 HC PT MOBILITY DISCHARGE 10/12/2018 Rom Alaniz, PT GP, CK 1    46060190235 HC PT THER PROC EA 15 MIN 10/12/2018 Rom Alaniz, PT GP 1    72291190841 HC GAIT TRAINING EA 15 MIN 10/12/2018 Rom Alaniz, PT GP 1          PT G-Codes  PT Professional Judgement Used?: Yes  Outcome Measure Options: AM-PAC 6 Clicks Basic Mobility (PT)  AM-PAC 6 Clicks Score: 18  Functional Limitation: Mobility: Walking and moving around  Mobility: Walking and Moving Around Current Status (): At least 40 percent but less than 60 percent impaired, limited or restricted  Mobility: Walking and Moving Around Goal Status (): At least 1 percent but less than 20 percent impaired, limited or restricted  Mobility: Walking and Moving Around Discharge Status (): At least 40 percent but less than 60 percent impaired, limited or  restricted    PT Discharge Summary  Reason for Discharge: Discharge from facility  Outcomes Achieved: Patient able to partially acheive established goals  Discharge Destination: Home with assist, Home with home health    Rom Alaniz, PT  10/12/2018

## 2018-10-12 NOTE — NURSING NOTE
Acute Pain Service:  Peripheral nerve catheter and disposable infusion device teaching completed with patient.  Video demonstration, handout and bracelet provided with [facility] on call central phone number.  Instructed to call with any questions or concerns.  Patient verbalized understanding.  Service will continue to follow until catheter DC'd.  Please contact patient at [phone] if needed.

## 2018-10-12 NOTE — PLAN OF CARE
Problem: Patient Care Overview  Goal: Plan of Care Review  Outcome: Ongoing (interventions implemented as appropriate)   10/12/18 9500   Plan of Care Review   Progress no change   OTHER   Outcome Summary pt up and ambulated 300 feet, nerve cath at 8 ml/hr, voids well, pain manageable, VSS, will continue to monitor.   Coping/Psychosocial   Plan of Care Reviewed With patient       Problem: Knee Arthroplasty (Total, Partial) (Adult)  Goal: Signs and Symptoms of Listed Potential Problems Will be Absent, Minimized or Managed (Knee Arthroplasty)  Outcome: Ongoing (interventions implemented as appropriate)

## 2018-10-12 NOTE — DISCHARGE PLACEMENT REQUEST
"Evelyn De Dios (63 y.o. Female)   From:  Vanda Foreman RN BSN, Case Management,  [phone]    Date of Birth Social Security Number Address Home Phone MRN    1955  [address] [facility] [address] [phone] [id number]    Yazidism Marital Status          [facility] Single       Admission Date Admission Type Admitting Provider Attending Provider Department, Room/Bed    10/11/18 Elective Dewey Jimenez MD Coy, Samuel Christopher, MD [facility] 3G, S356/1    Discharge Date Discharge Disposition Discharge Destination                       Attending Provider:  Dewey Jimenez MD    Allergies:  Penicillins, Ibuprofen    Isolation:  None   Infection:  None   Code Status:  CPR    Ht:  167.6 cm (66\")   Wt:  92.5 kg (203 lb 14.8 oz)    Admission Cmt:  None   Principal Problem:  Status post total left knee replacement [Z96.652]                 Active Insurance as of 10/11/2018     Primary Coverage     Payor Plan Insurance Group Employer/Plan Group    [employer] [facility] EMPLOYEE [id number]     Payor Plan Address Payor Plan Phone Number Effective From Effective To    [id number] [phone] 2015     [facility] [place] [facility] [address]       Subscriber Name Subscriber Birth Date Member ID       EVELYN DE DIOS 1955 [id number]                 Emergency Contacts      (Rel.) Home Phone Work Phone Mobile Phone    Tom De Dios (Son) -- -- [phone]        [address] [address]  Phone:  [phone]  Fax:          Patient:     Evelyn De Dios MRN:  [id number]   [address] [facility] [address] :  1955  SSN:    Phone: [phone] Sex:  F      INSURANCE PAYOR PLAN GROUP # SUBSCRIBER ID   Primary:    LINDA SOTO [id number]   Admitting Diagnosis: Primary osteoarthritis of left knee [M17.12]  Order Date:  " 2018         Inpatient Case Management  Consult       (Order ID: [id number])     Diagnosis:         Priority:  Routine Expected Date:   Expiration Date:        Interval:   Count:    Reason for Consult? home equipment needs, therapy at [facility]     Specimen Type:   Specimen Source:   Specimen Taken Date:   Specimen Taken Time:                   Authorizing Provider:Dewey Jimenez MD  Authorizing Provider's NPI: [id number]  Order Entered By: Dewey Jimenez MD 10/11/2018 10:39 AM     Electronically signed by: Dewey Jimenez MD 10/11/2018 10:39 AM          History & Physical      Anderson Diaz MD at 10/11/2018 11:54 AM          Patient Name: Evelyn Medina  MRN: [id number]  : 1955  DOS: 10/11/2018    Attending: Dewey Jimenez,*    Primary Care Provider: Provider, No Known      Chief complaint:  Left knee pain    Subjective   Patient is a 63 y.o. female presented for left total knee arthroplasty by Dr. Jimenez under spinal anesthesia. She tolerated surgery well and is admitted for further medical management. Her knee has been painful for about a year and a half. She denies use of assistive device for ambulation or recent falls.    When seen postop she is doing well. She reports moderate knee pain. She denies nausea, shortness of breath or chest pain. No hx of DVT or PE.    ( Above is noted/ agree. Doing well. Fair pain control. Ambulated to BR when I saw her , then walked 130 ft with PT with RW. Mostly bothered by right eye irritation postop .)wy       Allergies:  Allergies   Allergen Reactions   • Penicillins Rash   • Ibuprofen Other (See Comments)     Irregular heart beat        Meds:  Prescriptions Prior to Admission   Medication Sig Dispense Refill Last Dose   • acetaminophen (TYLENOL) 325 MG tablet Take 650 mg by mouth Every 6 (Six) Hours As Needed for Mild Pain .   Past Month at Unknown time   • Lifitegrast (XIIDRA OP) Administer 1 drop to  "both eyes 2 (Two) Times a Day.   10/11/2018 at 0400   • Multiple Vitamins-Minerals (WOMENS MULTIVITAMIN PO) Take 1 tablet by mouth Daily.   10/10/2018 at 0800   • polyethyl glycol-propyl glycol (SYSTANE) 0.4-0.3 % solution ophthalmic solution Administer 1 drop to both eyes Every Night.   10/10/2018 at 2200   • Sennosides (SENNA LAX PO) Take 1 tablet by mouth Daily.   10/10/2018 at 0800       History:   Past Medical History:   Diagnosis Date   • Arthritis    • History of transfusion    • Osteoarthritis    • PONV (postoperative nausea and vomiting)      Past Surgical History:   Procedure Laterality Date   •  SECTION      X 2   • EYE SURGERY Left    • KNEE SURGERY Left     torn menisus     History reviewed. No pertinent family history.  Social History   Substance Use Topics   • Smoking status: Former Smoker     Quit date:    • Smokeless tobacco: Never Used   • Alcohol use No   She lives with her son. She has 2 children. She is retired RN.    Review of Systems  All systems were reviewed and negative except for:  Gastrointestinal: postitive for  constipation    Vital Signs  /66 (BP Location: Left arm, Patient Position: Lying)   Pulse 66   Temp 97.6 °F (36.4 °C) (Oral)   Resp 16   Ht 167.6 cm (66\")   Wt 92.5 kg (203 lb 14.8 oz)   SpO2 98%   BMI 32.91 kg/m²      Physical Exam:    General Appearance:    Alert, cooperative, in no acute distress   Head:    Normocephalic, without obvious abnormality, atraumatic   Eyes:            Lids and lashes normal, conjunctivae and sclerae normal, no   icterus, no pallor, corneas clear ( slight redness/ right eye)wy   Ears:    Ears appear intact with no abnormalities noted   Neck:   No adenopathy, supple, trachea midline, no thyromegaly, no     carotid bruit, no JVD   Lungs:     Clear to auscultation,respirations regular, even and                   unlabored    Heart:    Regular rhythm and normal rate, normal S1 and S2, no            murmur, no gallop, no rub, no " click   Abdomen:     Normal bowel sounds, no masses, no organomegaly, soft        non-tender, non-distended, no guarding, no rebound                 tenderness   Genitalia:    Deferred   Extremities:  Left knee ACE wrap CDI. Nerve block present   Pulses:   Pulses palpable and equal bilaterally   Skin:   No bleeding, bruising or rash   Neurologic:   Cranial nerves 2 - 12 grossly intact, sensation intact. Flexion and dorsiflexion intact bilateral feet.        I reviewed the patient's new clinical results.     Results for AMINA DE DIOS (MRN [id number]) as of 10/11/2018 11:58   Ref. Range 10/2/2018 12:24   Glucose Latest Ref Range: 70 - 100 mg/dL 93   Sodium Latest Ref Range: 132 - 146 mmol/L 137   Potassium Latest Ref Range: 3.5 - 5.5 mmol/L 4.2   CO2 Latest Ref Range: 20.0 - 31.0 mmol/L 28.0   Chloride Latest Ref Range: 99 - 109 mmol/L 105   Anion Gap Latest Ref Range: 3.0 - 11.0 mmol/L 4.0   Creatinine Latest Ref Range: 0.60 - 1.30 mg/dL 0.74   BUN Latest Ref Range: 9 - 23 mg/dL 22   BUN/Creatinine Ratio Latest Ref Range: 7.0 - 25.0  29.7 (H)   Calcium Latest Ref Range: 8.7 - 10.4 mg/dL 9.2   eGFR Non African Amer Latest Ref Range: >60 mL/min/1.73 80   Hemoglobin A1C Latest Ref Range: 4.80 - 5.60 % 5.60   WBC Latest Ref Range: 3.50 - 10.80 10*3/mm3 7.67   RBC Latest Ref Range: 3.89 - 5.14 10*6/mm3 5.15 (H)   Hemoglobin Latest Ref Range: 11.5 - 15.5 g/dL 14.3   Hematocrit Latest Ref Range: 34.5 - 44.0 % 43.9   RDW Latest Ref Range: 11.3 - 14.5 % 12.6   MCV Latest Ref Range: 80.0 - 99.0 fL 85.2   MCH Latest Ref Range: 27.0 - 31.0 pg 27.8   MCHC Latest Ref Range: 32.0 - 36.0 g/dL 32.6   MPV Latest Ref Range: 6.0 - 12.0 fL 10.7   Platelets Latest Ref Range: 150 - 450 10*3/mm3 241     Assessment and Plan:     Status post total left knee replacement    Primary osteoarthritis of left knee      Plan  1. PT/OT- early ambulation post op  2. Pain control-prns, AC nerve block   3. IS-encourage  4. DVT proph- mechs/ASA  5.  Bowel regimen  6. Resume home medications as appropriate  7. Monitor post-op labs  8. DC planning for home    I have personally performed the evaluation on this patient. My history is consistent  with HPI obtained. My exam findings are listed above. I have personally reviewed and discussed the above formulated treatment plan with pt, family and . [email or web address].wy.      HARINDER Kelly  10/11/18  11:56 AM    Electronically signed by Anderson Diaz MD at 10/11/2018  2:56 PM     Maira Silverman APRN at 10/11/2018  6:43 AM     Attestation signed by Dewey Jimenez MD at 10/11/2018  7:16 AM    H&P reviewed, agree w plan                  Pre-Op H&P  Evelynbetsey Collazostivencassi  [id number]  1955    Chief complaint: Left knee pain    HPI:    Patient is a 63 y.o.female who presents with left knee pain and found to have left knee osteoarthritis and here today for left total knee replacement.     Review of Systems:  General ROS: negative for chills, fever or skin lesions;  No changes since last office visit  Cardiovascular ROS: no chest pain or dyspnea on exertion  Respiratory ROS: no cough, shortness of breath, or wheezing    Allergies:   Allergies   Allergen Reactions   • Penicillins Rash   • Ibuprofen Other (See Comments)     Irregular heart beat        Home Meds:    No current facility-administered medications on file prior to encounter.      No current outpatient prescriptions on file prior to encounter.       PMH:   Past Medical History:   Diagnosis Date   • Arthritis    • History of transfusion    • Osteoarthritis    • PONV (postoperative nausea and vomiting)      PSH:    Past Surgical History:   Procedure Laterality Date   •  SECTION      X 2   • EYE SURGERY Left    • KNEE SURGERY Left     torn menisus     Social History:   Tobacco:   History   Smoking Status   • Former Smoker   • Quit date:    Smokeless Tobacco   • Never Used      Alcohol:     History   Alcohol Use No       Vitals:            "/54 (BP Location: Right arm, Patient Position: Lying)   Pulse 70   Temp 97.2 °F (36.2 °C) (Temporal Artery )   Resp 16   Ht 167.6 cm (66\")   Wt 92.5 kg (203 lb 14.8 oz)   SpO2 93%   BMI 32.91 kg/m²      Physical Exam:  General Appearance:    Alert, cooperative, no distress, appears stated age   Head:    Normocephalic, without obvious abnormality, atraumatic   Lungs:     Clear to auscultation bilaterally, respirations unlabored    Heart:   Regular rate and rhythm, S1 and S2 normal, no murmur, rub    or gallop    Abdomen:    Soft, non-tender.  +bowel sounds   Breast Exam:    deferred   Genitalia:    deferred   Extremities:   Extremities normal, atraumatic, no cyanosis or edema   Skin:   Skin color, texture, turgor normal, no rashes or lesions   Neurologic:   Grossly intact   Results Review  I reviewed the patient's new clinical results.    Cancer Staging (if applicable)  Cancer Patient: __ yes _x_no __unknown; If yes, clinical stage T:__ N:__M:__, stage group or __N/A    Impression: Left knee osteoarthritis    Plan: Left total knee replacement    Maira MENDOSAYessy Silverman, APRN   10/11/2018   6:44 AM    Electronically signed by Dewey Jimenez MD at 10/11/2018  7:16 AM          Operative/Procedure Notes (most recent note)      Dewey Jimenez MD at 10/11/2018  7:56 AM        Preoperative diagnosis:Left knee end stage osteoarthritis    Postoperative diagnosis: Left knee end stage osteoarthritis    Operative procedure: Left total knee arthroplasty    Surgeon: Dr. Tesfaye Jimenez    Assistant: IAIN Molina.  Necessary during the case for positioning of the patient, exposure, implantation of components and closure.    Anesthesia: Spinal with local and adductor canal catheter    Estimated blood loss: Minimal    Implants: [employer]  posterior stabilized total knee arthroplasty size 6 femur, 4 tibia, 29 patella, 9 poly    Tourniquet time: 82 minutes at 300 mmHg    Indications for procedure: " Evelyn is a very pleasant 63-year-old female who has struggled with end-stage activity limiting left knee pain secondary to osteoarthritis.  X-rays in January revealed severe tricompartmental degenerative changes in a varus knee with complete loss of medial joint space and marginal osteophyte formation.  They have failed exhaustive conservative treatment measures including cortisone injections in April and physical therapy at [facility].  She was not interested in Visco supplementation injections.  She had a left knee arthroscopy by Dr. Michael in 2017.  After undergoing a shared decision-making process they agreed to proceed with left total knee arthroplasty.  Surgical consent form was signed.    Description of procedure: The patient was seen in the preoperative holding area and the left leg was signed to confirm the correct operative site.  They were seen by anesthesia.  They received Clindamycin 900mg IV prophylactic antibiotics within 1 hour of incision time.  They were brought back to the operating room.  Spinal was performed without difficulty and they were given sedation throughout the case.  Patient placed in the supine position and all bony prominences were well-padded.  A bump was placed underneath the left hip.  Nonsterile tourniquet was applied to the thigh.  The left lower extremity was prepped and draped in the usual sterile fashion and timeout was performed to confirm left total knee arthroplasty for patient Evelyn Medina.    The left lower extremity was exsanguinated with an Esmarch.  Tourniquet was inflated to 300 mmHg.  With the knee flexed a midline incision was made with a 10 blade scalpel.  Adequate hemostasis maintained with Bovie electrocautery.  Full-thickness medial and lateral flaps were elevated.  Using a fresh 10 blade scalpel I made a medial parapatellar arthrotomy.  The patella was everted.  Patella fat pad and anterior femoral fat pads were excised.  Marginal osteophytes were  removed.  Medial release was performed for this varus knee using Bovie electrocautery.  Alexandria's line was marked.  Z retractors were placed medially and laterally.  The distal femur was then drilled and intramedullary distal femoral cutting guide was pinned in place set at a 5° valgus cut for a 9.5 mm cut.  This cut was then made with the oscillating saw.  The femur was then sized to a size 6 set at 3° of external rotation.  4-in-1 cutting guide was pinned in place.  Anterior and posterior cuts were made as were the chamfer cuts.  Cut bone was removed.  We then turned our attention to the tibia.    The tibia was subluxed anteriorly. PCL retractor was placed as were medial and lateral Hohmann retractors.  We then used the extramedullary tibial cutting guide set at 3° posterior slope for the proximal tibial cut.  5 mm of bone was removed from the low medial side and a centimeter from the high lateral side.  Medial and lateral menisci were then excised as were posterior osteophytes.  Flexion and extension gaps were then checked and with a 9 mm block we achieved full extension and flexion with excellent alignment. The tibia was sized to a 4 tibial tray centered off the medial third of the tibial tubercle.  The tray was pinned in place.  We then impacted the tibial fins.  Size 6  trial femur was then placed and box cut was made with the reamer and punch. We trialed with a size 9 poly, 6 femur and 4 tibia.  With these trial components in place we achieved full extension and flexion with excellent alignment and stable throughout.     We then turned our attention to the patella.  Patella was sized to 20 mm in thickness and we made a 7 mm patellar cut with the reciprocal saw.  A 29 trial was placed and the patella drill holes were made.  With the trial button in place there was excellent tracking with the no thumbs technique.    Trial components were then removed.  Final components were opened on the back table.   Posterior capsule was injected with 20 mL of half percent ropivacaine and 5 mg of morphine.  Cement was mixed on the back table.  The knee was copiously irrigated with Pulsavac lavage.  The knee was thoroughly dried and a bone plug was placed in the distal femoral drill hole.  Cement was then applied to the tibia and final size 4 tibial tray was impacted in place and excess cement was removed.  Cement was applied to the femur and final size 6 femur was impacted in place and excess cement removed.  We then placed a 9 mm poly-this was seen to be fully seated in the tibial tray.  Knee was then placed in extension and we applied cement to the cut patellar surface and 29 thin patella was held in place with patellar clamp.  All excess cement was removed.  The knee was left in extension while cement cured.    Once the cement was fully cured we irrigated the knee with diluted Betadine solution and Pulsavac lavage.  The knee was taken through full range of motion and seen to achieve full extension and flexion with excellent patellar tracking.    We then proceeded with closure.  The deep fascia was closed with a #1 Stratafix barbed suture.  Dermis was closed with 2-0 Vicryl.  Skin was closed with a running 3-0 Stratafix barbed subcuticular suture.  A sterile dressing was then applied with Prineo mesh dressing and Dermabond.  We then applied 4 x 4's, ABDs, soft roll and a six-inch Ace bandage.    Tourniquet was deflated at 82 minutes.  Patient was transferred to recovery in stable condition.  All sponge and needle counts were correct ×2.  Patient received first dose of tranexamic acid just prior to incision and the second dose 5-10 minutes prior to letting down the tourniquet to minimize bleeding.    Postoperative plan:  Patient will be admitted to Dr. LEO for medical management  Mobilize starting today with PT/OT as tolerated  Start aspirin for DVT prophylaxis tomorrow   consult for home physical therapy and home  "equipment needs. Plan outpatient therapy at [facility]  Follow-up with me in 2-3 weeks.    Dewey Jimenez MD  10/11/18  9:35 AM            Electronically signed by Dewey Jimenez MD at 10/11/2018 12:16 PM          Physician Progress Notes (most recent note)      Dewey Jimenez MD at 10/12/2018  7:42 AM          /54 (BP Location: Left arm, Patient Position: Lying)   Pulse 85   Temp 98.8 °F (37.1 °C) (Oral)   Resp 18   Ht 167.6 cm (66\")   Wt 92.5 kg (203 lb 14.8 oz)   SpO2 95%   BMI 32.91 kg/m²      Lab Results (last 24 hours)     Procedure Component Value Units Date/Time    Basic Metabolic Panel [[id number]]  (Abnormal) Collected:  10/12/18 0430    Specimen:  Blood Updated:  10/12/18 0518     Glucose 113 (H) mg/dL      BUN 14 mg/dL      Creatinine 0.62 mg/dL      Sodium 134 mmol/L      Potassium 3.6 mmol/L      Chloride 104 mmol/L      CO2 25.0 mmol/L      Calcium 8.5 (L) mg/dL      eGFR Non African Amer 98 mL/min/1.73      BUN/Creatinine Ratio 22.6     Anion Gap 5.0 mmol/L     Narrative:       National Kidney Foundation Guidelines    Stage     Description        GFR  1         Normal or High     90+  2         Mild decrease      60-89  3         Moderate decrease  30-59  4         Severe decrease    15-29  5         Kidney failure     <15    The MDRD GFR formula is only valid for adults with stable renal function between ages 18 and 70.    CBC & Differential [[id number]] Collected:  10/12/18 0430    Specimen:  Blood Updated:  10/12/18 0514    Narrative:       The following orders were created for panel order CBC & Differential.  Procedure                               Abnormality         Status                     ---------                               -----------         ------                     CBC Auto Differential[[id number]]        Abnormal            Final result                 Please view results for these tests on the individual orders.    CBC Auto Differential " [[id number]]  (Abnormal) Collected:  10/12/18 0430    Specimen:  Blood Updated:  10/12/18 0514     WBC 8.18 10*3/mm3      RBC 4.10 10*6/mm3      Hemoglobin 11.3 (L) g/dL      Hematocrit 35.6 %      MCV 86.8 fL      MCH 27.6 pg      MCHC 31.7 (L) g/dL      RDW 12.8 %      RDW-SD 40.7 fl      MPV 10.8 fL      Platelets 172 10*3/mm3      Neutrophil % 67.1 %      Lymphocyte % 17.0 (L) %      Monocyte % 13.8 (H) %      Eosinophil % 1.7 %      Basophil % 0.4 %      Immature Grans % 0.1 %      Neutrophils, Absolute 5.49 10*3/mm3      Lymphocytes, Absolute 1.39 10*3/mm3      Monocytes, Absolute 1.13 (H) 10*3/mm3      Eosinophils, Absolute 0.14 10*3/mm3      Basophils, Absolute 0.03 10*3/mm3      Immature Grans, Absolute 0.01 10*3/mm3           Imaging Results (last 24 hours)     Procedure Component Value Units Date/Time    XR Knee 1 or 2 View Left [[id number]] Collected:  10/11/18 1016     Updated:  10/11/18 1420    Narrative:       EXAMINATION: XR KNEE 1 OR 2 VW LEFT- 10/11/2018     INDICATION: Post-Op Knee Arthoplasty      COMPARISON: NONE     FINDINGS: 2 views of the left knee reveal patient to be status post  total left knee arthroplasty. Postsurgical changes seen in the soft  tissues. No hardware complication or malalignment identified of the  prosthesis.           Impression:       Status post total left hip arthroplasty with postsurgical  changes seen within the soft tissues.     D:  10/11/2018  E:  10/11/2018     This report was finalized on 10/11/2018 2:18 PM by Dr. Miranda Le MD.             Patient Care Team:  Provider, No Known as PCP - General    SUBJECTIVE: Evelyn reports she is doing well.  Reports her eye feels much better this morning.  Knee pain is well-controlled.  Tolerating a regular diet.  She walked 130 feet with therapy yesterday and 300 feet this morning.  She would like to go home today.    PHYSICAL EXAM  Left knee incision is clean, dry and intact with mesh dressing in place  Thigh and  calf soft and nontender  Neurovascular intact distally       Status post total left knee replacement    Primary osteoarthritis of left knee      PLAN / DISPOSITION: 63-year-old female postop day #1 status post left total knee arthroplasty  -Continue to mobilize with therapy as tolerated  -Aspirin for DVT prophylaxis, H&H stable  -Okay to shower with mesh dressing in place once pain catheter removed  -Plan home later today with outpatient physical therapy at [facility]  -Follow-up in 2 weeks        :Dewey Jimenez MD  10/12/18  7:42 AM        Electronically signed by Dewey Jimenez MD at 10/12/2018  7:47 AM       Consult Notes (most recent note)     No notes of this type exist for this encounter.           Physical Therapy Notes (most recent note)      Rom Alaniz, CHIVO at 10/12/2018  8:55 AM  Version 1 of 1         Acute Care - Physical Therapy Treatment Note/[facility]     Patient Name: Evelyn Medina  : 1955  MRN: [id number]  Today's Date: 10/12/2018  Onset of Illness/Injury or Date of Surgery: 10/11/18  Date of Referral to PT: 10/11/18  Referring Physician: MD Tony    Admit Date: 10/11/2018    Visit Dx:    ICD-10-CM ICD-9-CM   1. Impaired functional mobility, balance, gait, and endurance Z74.09 V49.89     Patient Active Problem List   Diagnosis   • Primary osteoarthritis of left knee   • Status post total left knee replacement       Physical Therapy Education     Title: PT OT SLP Therapies (Done)     Topic: Physical Therapy (Done)     Point: Mobility training (Done)    Learning Progress Summary     Learner Status Readiness Method Response Comment Documented by    Patient Done Acceptance E,D,H VU Reviewed HEP, gait mechanics, stair sequencing, car transfer, benefits of mobility. Issued HEP handout  10/12/18 0820     Done Acceptance E,VERO WELCH,NR Educated on precautions, weight bearing status, correct t/f technique, correct gait mechanics, and progression of POC. LR  10/11/18 1327    Family Done Acceptance E,D,H VU Reviewed HEP, gait mechanics, stair sequencing, car transfer, benefits of mobility. Issued HEP handout  10/12/18 0820     Done Acceptance E,D VU,NR Educated on precautions, weight bearing status, correct t/f technique, correct gait mechanics, and progression of POC. LR 10/11/18 1327          Point: Home exercise program (Done)    Learning Progress Summary     Learner Status Readiness Method Response Comment Documented by    Patient Done Acceptance E,D,H VU Reviewed HEP, gait mechanics, stair sequencing, car transfer, benefits of mobility. Issued HEP handout  10/12/18 0820     Done Acceptance E,D VU,NR Educated on precautions, weight bearing status, correct t/f technique, correct gait mechanics, and progression of POC. LR 10/11/18 1327    Family Done Acceptance E,D,H VU Reviewed HEP, gait mechanics, stair sequencing, car transfer, benefits of mobility. Issued HEP handout  10/12/18 0820     Done Acceptance E,D VU,NR Educated on precautions, weight bearing status, correct t/f technique, correct gait mechanics, and progression of POC. LR 10/11/18 1327          Point: Body mechanics (Done)    Learning Progress Summary     Learner Status Readiness Method Response Comment Documented by    Patient Done Acceptance E,D,H VU Reviewed HEP, gait mechanics, stair sequencing, car transfer, benefits of mobility. Issued HEP handout  10/12/18 0820     Done Acceptance E,D VU,NR Educated on precautions, weight bearing status, correct t/f technique, correct gait mechanics, and progression of POC. LR 10/11/18 1327    Family Done Acceptance E,D,H VU Reviewed HEP, gait mechanics, stair sequencing, car transfer, benefits of mobility. Issued HEP handout  10/12/18 0820     Done Acceptance E,D VU,NR Educated on precautions, weight bearing status, correct t/f technique, correct gait mechanics, and progression of POC. LR 10/11/18 1327          Point: Precautions (Done)    Learning  Progress Summary     Learner Status Readiness Method Response Comment Documented by    Patient Done Acceptance E,D,H YURI Reviewed HEP, gait mechanics, stair sequencing, car transfer, benefits of mobility. Issued HEP handout  10/12/18 0820     Done Acceptance ANGELA,D YURI,NR Educated on precautions, weight bearing status, correct t/f technique, correct gait mechanics, and progression of POC. LR 10/11/18 1327    Family Done Acceptance ANGELA,VERO,H YURI Reviewed HEP, gait mechanics, stair sequencing, car transfer, benefits of mobility. Issued HEP handout  10/12/18 0820     Done Acceptance ANGELA,D YURI,NR Educated on precautions, weight bearing status, correct t/f technique, correct gait mechanics, and progression of POC. LR 10/11/18 1327                      User Key     Initials Effective Dates Name Provider Type Discipline    LR 06/19/15 -  Elli Bills, PT Physical Therapist PT     04/03/18 -  Rom Alaniz, PT Physical Therapist PT                    PT Rehab Goals     Row Name 10/12/18 0820             Bed Mobility Goal 1 (PT)    Activity/Assistive Device (Bed Mobility Goal 1, PT) sit to supine/supine to sit  -MJ      [place] Level/Cues Needed (Bed Mobility Goal 1, PT) conditional independence  -MJ      Time Frame (Bed Mobility Goal 1, PT) long term goal (LTG);3 days  -MJ      Progress/Outcomes (Bed Mobility Goal 1, PT) goal not met;discharged from facility  -MJ         Transfer Goal 1 (PT)    Activity/Assistive Device (Transfer Goal 1, PT) sit-to-stand/stand-to-sit;walker, rolling  -MJ      [place] Level/Cues Needed (Transfer Goal 1, PT) conditional independence  -MJ      Time Frame (Transfer Goal 1, PT) long term goal (LTG);3 days  -MJ      Progress/Outcome (Transfer Goal 1, PT) goal not met;discharged from facility  -MJ         Gait Training Goal 1 (PT)    Activity/Assistive Device (Gait Training Goal 1, PT) gait (walking locomotion);walker, rolling  -MJ      [place] Level (Gait Training Goal 1, PT)  conditional independence  -MJ      Distance (Gait Goal 1, PT) 500 feet  -MJ      Time Frame (Gait Training Goal 1, PT) long term goal (LTG);3 days  -MJ      Progress/Outcome (Gait Training Goal 1, PT) goal not met;discharged from facility  -MJ         ROM Goal 1 (PT)    ROM Goal 1 (PT) 0-90 degrees AAROM L knee  -MJ      Time Frame (ROM Goal 1, PT) long term goal (LTG);3 days  -MJ      Progress/Outcome (ROM Goal 1, PT) goal partially met   achieved flexion  -MJ         Stairs Goal 1 (PT)    Activity/Assistive Device (Stairs Goal 1, PT) ascending stairs;descending stairs;step-to-step;walker, rolling  -MJ      [place] Level/Cues Needed (Stairs Goal 1, PT) contact guard assist  -MJ      Number of Stairs (Stairs Goal 1, PT) 1  -MJ      Time Frame (Stairs Goal 1, PT) long term goal (LTG);3 days  -MJ      Progress/Outcome (Stairs Goal 1, PT) goal met  -MJ        User Key  (r) = Recorded By, (t) = Taken By, (c) = Cosigned By    Initials Name Provider Type Discipline     Rom Alaniz, PT Physical Therapist PT        Therapy Treatment        Rehabilitation Treatment Summary     Row Name 10/12/18 0820             Treatment Time/Intention    Discipline physical therapist  -MJ      Document Type therapy note (daily note);discharge treatment  -      Subjective Information complains of;pain  -MJ      Mode of Treatment physical therapy  -MJ      Patient/Family Observations Pt supine in bed, L adductor nerve catheter, IV. Family present  -      Care Plan Review patient/other agree to care plan  -MJ      Patient Effort excellent  -MJ      Existing Precautions/Restrictions fall;other (see comments)   L adductor nerve catheter  -MJ      Recorded by [MJ] Rom Alaniz, PT 10/12/18 0852      Row Name 10/12/18 0820             Cognitive Assessment/Intervention- PT/OT    Orientation Status (Cognition) oriented x 4  -MJ      Follows Commands (Cognition) WFL  -MJ      Recorded by [LIANA] Rom Alaniz, PT 10/12/18 0852      Row Name  10/12/18 0820             Safety Issues, Functional Mobility    Impairments Affecting Function (Mobility) pain;strength  -MJ      Recorded by [MJ] Rom Alaniz, PT 10/12/18 0852      Row Name 10/12/18 0820             Mobility Assessment/Intervention    Extremity Weight-bearing Status left lower extremity  -MJ      Left Lower Extremity (Weight-bearing Status) weight-bearing as tolerated (WBAT)  -MJ      Recorded by [MJ] Rom Alaniz, PT 10/12/18 0852      Row Name 10/12/18 0820             Bed Mobility Assessment/Treatment    Bed Mobility Assessment/Treatment supine-sit  -MJ      Supine-Sit [place] (Bed Mobility) minimum assist (75% patient effort);verbal cues  -MJ      Bed Mobility, Safety Issues decreased use of legs for bridging/pushing  -MJ      Assistive Device (Bed Mobility) bed rails;head of bed elevated  -MJ      Comment (Bed Mobility) Verbal cues to move LEs off EOB, assist required with L LE.   -MJ      Recorded by [MJ] Rom Alaniz, PT 10/12/18 0852      Row Name 10/12/18 0820             Transfer Assessment/Treatment    Transfer Assessment/Treatment sit-stand transfer;stand-sit transfer;toilet transfer  -      Comment (Transfers) Verbal cues for correct hand placement and to step L LE out prior to t/f. Assisted pt to bathroom, cues to reach back for grab bar prior to t/f. Pt independent with hygiene after toileting  -MJ      Recorded by [MJ] Rom Alaniz, PT 10/12/18 0852      Row Name 10/12/18 0820             Sit-Stand Transfer    Sit-Stand [place] (Transfers) contact guard;verbal cues  -MJ      Assistive Device (Sit-Stand Transfers) walker, front-wheeled  -MJ      Recorded by [MJ] Rom Alaniz, PT 10/12/18 0852      Row Name 10/12/18 0820             Stand-Sit Transfer    Stand-Sit [place] (Transfers) contact guard;verbal cues  -MJ      Assistive Device (Stand-Sit Transfers) walker, front-wheeled  -MJ      Recorded by [MJ] Rom Alaniz, PT 10/12/18 0852      Row Name 10/12/18 0820              Toilet Transfer    Type (Toilet Transfer) sit-stand;stand-sit  -MJ      [place] Level (Toilet Transfer) contact guard;verbal cues  -MJ      Assistive Device (Toilet Transfer) walker, front-wheeled  -MJ      Recorded by [MJ] Rom Alaniz, PT 10/12/18 0852      Row Name 10/12/18 0820             Gait/Stairs Assessment/Training    91607 - Gait Training Minutes  10  -MJ      [place] Level (Gait) contact guard;verbal cues  -MJ      Assistive Device (Gait) walker, front-wheeled  -MJ      Distance in Feet (Gait) 300  -MJ      Pattern (Gait) step-through  -MJ      Deviations/Abnormal Patterns (Gait) left sided deviations;antalgic;tobias decreased;stride length decreased  -MJ      Bilateral Gait Deviations forward flexed posture;heel strike decreased;weight shift ability decreased  -MJ      [place] Level (Stairs) contact guard;verbal cues  -MJ      Assistive Device (Stairs) walker, front-wheeled  -MJ      Handrail Location (Stairs) none  -MJ      Number of Steps (Stairs) 1  -MJ      Ascending Technique (Stairs) step-to-step  -MJ      Descending Technique (Stairs) step-to-step  -MJ      Stairs, Safety Issues sequencing ability decreased;weight-shifting ability decreased  -MJ      Stairs, Impairments ROM decreased;strength decreased;pain  -MJ      Comment (Gait/Stairs) Pt demo step through gait pattern at slow pace. Verbal cues for increased LE weight bearing, improvement noted. Pt with good heel strike throughout and gait sequencing became smoother with increased distance. Pt performed 1 step backward with RW. Cues to ascend with R LE and to descend forward with L LE.   -MJ      Recorded by [MJ] Rom Alaniz, PT 10/12/18 0852      Row Name 10/12/18 0820             Left Lower Ext    LT Lower Extremity Comments L knee 9-91 degrees; pt lacking 9 degrees from full extension; pt seated to measure AAROM flexion  -MJ      Recorded by [MJ] Rom Alaniz, PT 10/12/18 0852      Row Name 10/12/18 0820              Motor Skills Assessment/Interventions    Additional Documentation Therapeutic Exercise (Group)  -MJ      Recorded by [MJ] Rom Alaniz, PT 10/12/18 0852      Row Name 10/12/18 0820             Therapeutic Exercise    57534 - PT Therapeutic Exercise Minutes 9  -MJ      01293 - PT Therapeutic Activity Minutes 4  -MJ      Recorded by [MJ] Rom Alaniz, PT 10/12/18 0852      Row Name 10/12/18 0820             Therapeutic Exercise    Lower Extremity (Therapeutic Exercise) gluteal sets;heel slides, left;LAQ (long arc quad), left;quad sets, left;SAQ (short arc quad), left;SLR (straight leg raise), left  -MJ      Lower Extremity Range of Motion (Therapeutic Exercise) ankle dorsiflexion/plantar flexion, left  -MJ      Exercise Type (Therapeutic Exercise) AAROM (active assistive range of motion)  -MJ      Position (Therapeutic Exercise) seated  -MJ      Sets/Reps (Therapeutic Exercise) 15x each  -MJ      Comment (Therapeutic Exercise) Cues for technique. Randy w/ SLR  -MJ      Recorded by [MJ] Rom Alaniz, PT 10/12/18 0852      Row Name 10/12/18 0820             Positioning and Restraints    Pre-Treatment Position in bed  -MJ      Post Treatment Position chair  -MJ      In Chair notified nsg;reclined;call light within reach;encouraged to call for assist;with family/caregiver  -MJ      Recorded by [MJ] Rom Alaniz, PT 10/12/18 0852      Row Name 10/12/18 0820             Pain Scale: Numbers Pre/Post-Treatment    Pain Scale: Numbers, Pretreatment 2/10  -MJ      Pain Scale: Numbers, Post-Treatment 6/10  -MJ      Pain Location - Side Left  -MJ      Pain Location - Orientation generalized  -MJ      Pain Location knee  -MJ      Pain Intervention(s) Repositioned;Ambulation/increased activity;Cold pack  -MJ      Recorded by [MJ] Rom Alaniz, PT 10/12/18 0852      Row Name                Wound 10/11/18 0904 Left leg incision    Wound - Properties Group Date first assessed: 10/11/18 [MR] Time first assessed: 0904 [MR] Side: Left  [MR] Location: leg [MR] Type: incision [MR] Recorded by:  [MR] Isabelle Hidalgo RN 10/11/18 0904    Row Name 10/12/18 0820             Plan of Care Review    Plan of Care Reviewed With patient;family  -MJ      Recorded by [MJ] Rom Alaniz, PT 10/12/18 0852      Row Name 10/12/18 0820             Outcome Summary/Treatment Plan (PT)    Daily Summary of Progress (PT) progress toward functional goals is good  -MJ      Recorded by [MJ] Rom Alaniz, PT 10/12/18 0852        User Key  (r) = Recorded By, (t) = Taken By, (c) = Cosigned By    Initials Name Effective Dates Discipline     Rom Alaniz, PT 04/03/18 -  PT    Isabelle Ann, RN - Nurse        Wound 10/11/18 0904 Left leg incision (Active)   Dressing Appearance no drainage;intact;dry 10/12/2018  5:48 AM       PT Recommendation and Plan  Therapy Frequency (PT Clinical Impression): 2 times/day  Outcome Summary/Treatment Plan (PT)  Daily Summary of Progress (PT): progress toward functional goals is good  Plan of Care Reviewed With: patient, family  Progress: improving  Outcome Summary: Pt increased gait distance to 300 feet with CGA and RW, limited by fatigue. Pt progressed to stair training x1 with RW to mimic home environment and ROM measured 9-91 degrees. Pt is discharging home today with outpatient PT, made good progress toward goals during inpatient stay          Outcome Measures     Row Name 10/12/18 0820 10/11/18 1327          How much help from another person do you currently need...    Turning from your back to your side while in flat bed without using bedrails? 3  -MJ 3  -LR     Moving from lying on back to sitting on the side of a flat bed without bedrails? 3  -MJ 3  -LR     Moving to and from a bed to a chair (including a wheelchair)? 3  -MJ 3  -LR     Standing up from a chair using your arms (e.g., wheelchair, bedside chair)? 3  -MJ 3  -LR     Climbing 3-5 steps with a railing? 3  -MJ 3  -LR     To walk in hospital room? 3  -MJ 3  -LR     AM-PAC 6 Clicks  Score 18  -MJ 18  -LR        Functional Assessment    Outcome Measure Options AM-PAC 6 Clicks Basic Mobility (PT)  -MJ AM-PAC 6 Clicks Basic Mobility (PT)  -LR       User Key  (r) = Recorded By, (t) = Taken By, (c) = Cosigned By    Initials Name Provider Type    LR Elli Bills, PT Physical Therapist    Rom Nice, PT Physical Therapist           Time Calculation:         PT Charges     Row Name 10/12/18 0820             Time Calculation    Start Time 0820  -MJ      PT Received On 10/12/18  -MJ      PT Goal Re-Cert Due Date 10/21/18  -MJ         Time Calculation- PT    Total Timed Code Minutes- PT 23 minute(s)  -MJ         Timed Charges    78588 - PT Therapeutic Exercise Minutes 9  -MJ      32584 - Gait Training Minutes  10  -MJ      73194 - PT Therapeutic Activity Minutes 4  -MJ        User Key  (r) = Recorded By, (t) = Taken By, (c) = Cosigned By    Initials Name Provider Type    Rom Nice, PT Physical Therapist        Therapy Suggested Charges     Code   Minutes Charges    72183 (CPT®) Hc Pt Neuromusc Re Education Ea 15 Min      14498 (CPT®) Hc Pt Ther Proc Ea 15 Min 9 1    44543 (CPT®) Hc Gait Training Ea 15 Min 10 1    79989 (CPT®) Hc Pt Therapeutic Act Ea 15 Min 4     68114 (CPT®) Hc Pt Manual Therapy Ea 15 Min      90035 (CPT®) Hc Pt Iontophoresis Ea 15 Min      82102 (CPT®) Hc Pt Elec Stim Ea-Per 15 Min      25785 (CPT®) Hc Pt Ultrasound Ea 15 Min      84464 (CPT®) Hc Pt Self Care/Mgmt/Train Ea 15 Min      87528 (CPT®) Hc Pt Prosthetic (S) Train Initial Encounter, Each 15 Min      69435 (CPT®) Hc Pt Orthotic(S)/Prosthetic(S) Encounter, Each 15 Min      56983 (CPT®) Hc Orthotic(S) Mgmt/Train Initial Encounter, Each 15min      Total  23 2          Therapy Charges for Today     Code Description Service Date Service Provider Modifiers Qty    72834059151 HC PT MOBILITY CURRENT 10/12/2018 Rom Alaniz, PT GP, CK 1    31265756067 HC PT MOBILITY PROJECTED 10/12/2018 Rom Alaniz, PT GP, CI 1     55537591358 HC PT MOBILITY DISCHARGE 10/12/2018 Rom Alaniz, PT GP, CK 1    67644955403 HC PT THER PROC EA 15 MIN 10/12/2018 Rom Alaniz, PT GP 1    11817896371 HC GAIT TRAINING EA 15 MIN 10/12/2018 Rom Alaniz, PT GP 1          PT G-Codes  PT Professional Judgement Used?: Yes  Outcome Measure Options: AM-PAC 6 Clicks Basic Mobility (PT)  AM-PAC 6 Clicks Score: 18  Functional Limitation: Mobility: Walking and moving around  Mobility: Walking and Moving Around Current Status (): At least 40 percent but less than 60 percent impaired, limited or restricted  Mobility: Walking and Moving Around Goal Status (): At least 1 percent but less than 20 percent impaired, limited or restricted  Mobility: Walking and Moving Around Discharge Status (): At least 40 percent but less than 60 percent impaired, limited or restricted    PT Discharge Summary  Reason for Discharge: Discharge from facility  Outcomes Achieved: Patient able to partially acheive established goals  Discharge Destination: Home with assist, Home with home health    Rom Alaniz, CHIVO  10/12/2018         Electronically signed by Rom Alaniz PT at 10/12/2018  8:55 AM

## 2018-10-12 NOTE — PLAN OF CARE
Problem: Patient Care Overview  Goal: Plan of Care Review  Outcome: Outcome(s) achieved Date Met: 10/12/18   10/12/18 0937   Plan of Care Review   Progress improving   OTHER   Outcome Summary OT evaluation completed. Post education pt. dressed self , groomed and sponged off with good independence and safety. Supervision transfer stand and toilet. Pt. declining any AE/AD. Plans home with family assist and OP PT. Goals met.   Coping/Psychosocial   Plan of Care Reviewed With family;patient

## 2018-10-12 NOTE — THERAPY DISCHARGE NOTE
Acute Care - Occupational Therapy Initial Eval/Discharge  [facility]     Patient Name: Evelyn Medina  : 1955  MRN: [id number]  Today's Date: 10/12/2018  Onset of Illness/Injury or Date of Surgery: 10/11/18  Date of Referral to OT: 10/11/18  Referring Physician: MD Tony      Admit Date: 10/11/2018       ICD-10-CM ICD-9-CM   1. Impaired functional mobility, balance, gait, and endurance Z74.09 V49.89   2. Impaired mobility and ADLs Z74.09 799.89     Patient Active Problem List   Diagnosis   • Primary osteoarthritis of left knee   • Status post total left knee replacement     Past Medical History:   Diagnosis Date   • Arthritis    • History of transfusion    • Osteoarthritis    • PONV (postoperative nausea and vomiting)      Past Surgical History:   Procedure Laterality Date   •  SECTION      X 2   • EYE SURGERY Left    • KNEE SURGERY Left     torn menisus          OT ASSESSMENT FLOWSHEET (last 72 hours)      Occupational Therapy Evaluation     Row Name 10/12/18 0937                   OT Evaluation Time/Intention    Subjective Information complains of;pain  -ALBERT        Document Type evaluation;therapy note (daily note);discharge treatment  -ALBERT        Mode of Treatment individual therapy;occupational therapy  -ALBERT        Patient Effort excellent  -ALBERT        Symptoms Noted During/After Treatment increased pain  -ALBERT        Comment RN to bring pain meds  -ALBERT           General Information    Patient Profile Reviewed? yes  -ALBERT        Onset of Illness/Injury or Date of Surgery 10/11/18  -ALBERT        Referring Physician MD Tony  -ALBERT        Patient Observations alert;cooperative;agree to therapy  -ALBERT        Patient/Family Observations Pt. supine in bed, L adductor nerve catheter.  Son present beginning and end of session, but out middle due to pt. dressing.  -ALBERT        General Observations of Patient Pt. up in recliner.  -ALBERT        Prior Level of Function independent:;feeding;grooming;driving;min assist:;all  household mobility;community mobility;ADL's;home management;shopping   extra time and pain with all task  -ALBERT        Equipment Currently Used at Home none  -ALBERT        Pertinent History of Current Functional Problem Patient presents for surgical management of persistent and progressive L knee apin and dysfunction that has failed to improve with conservative management.  -ALBERT        Existing Precautions/Restrictions fall;other (see comments)   L adductor nerve catheter.  -ALBERT        Equipment Issued to Patient --   benefit reacher and leg , pt. declined.  -ALBERT        Risks Reviewed patient and family:;LOB;increased discomfort;lines disloged  -ALBERT        Benefits Reviewed patient and family:;increase independence;improve function;increase knowledge  -ALBERT        Barriers to Rehab previous functional deficit  -ALBERT           Relationship/Environment    Lives With child(subhash), adult;child(subhash), dependent   son, MYRANDA and 3 GC  -ALBERT        Name(s) of Who Lives With Patient MYRANDA works 2 miles away.  -ALBERT           Resource/Environmental Concerns    Current Living Arrangements home/apartment/condo  -ALBERT           Home Main Entrance    Number of Stairs, Main Entrance one  -ALBERT        Stair Railings, Main Entrance none  -ALBERT           Cognitive Assessment/Interventions    Additional Documentation Cognitive Assessment/Intervention (Group)  -ALBERT           Cognitive Assessment/Intervention- PT/OT    Orientation Status (Cognition) oriented x 4  -ALBERT        Follows Commands (Cognition) WFL  -ALBERT        Personal Safety Interventions fall prevention program maintained;gait belt;nonskid shoes/slippers when out of bed  -ALBERT           Safety Issues, Functional Mobility    Impairments Affecting Function (Mobility) pain;strength  -ALBERT           Mobility Assessment/Treatment    Extremity Weight-bearing Status left lower extremity  -ALBERT        Left Lower Extremity (Weight-bearing Status) weight-bearing as tolerated (WBAT)  -ALBERT           Bed Mobility  Assessment/Treatment    Comment (Bed Mobility) per PT note pt. min assist OOB.  Per pt. she can use her other leg or UE's and will find a way.  Refused leg .  -           Functional Mobility    Functional Mobility- Ind. Level supervision required  -        Functional Mobility- Device rolling walker  -        Functional Mobility-Distance (Feet) 46  -        Functional Mobility- Safety Issues step length decreased  -        Functional Mobility- Comment Pt. was able to remember not to pivot on knee with turns.  -           Transfer Assessment/Treatment    Transfer Assessment/Treatment sit-stand transfer;stand-sit transfer;toilet transfer  -        Comment (Transfers) pt. pushed up and reached back and slid LE out each time.  -ALBERT           Sit-Stand Transfer    Sit-Stand [place] (Transfers) supervision  -        Assistive Device (Sit-Stand Transfers) walker, front-wheeled  -ALBERT           Stand-Sit Transfer    Stand-Sit [place] (Transfers) supervision  -        Assistive Device (Stand-Sit Transfers) walker, front-wheeled  -ALBERT           Toilet Transfer    Type (Toilet Transfer) sit-stand;stand-sit  -ALBERT        [place] Level (Toilet Transfer) supervision  -        Assistive Device (Toilet Transfer) walker, front-wheeled;grab bars/safety frame   RTS  -           ADL Assessment/Intervention    14658 - OT Self Care/Mgmt Minutes 10  -        BADL Assessment/Intervention lower body dressing;toileting;grooming;bathing  -           Bathing Assessment/Intervention    Bathing [place] Level set up;supervision   pt. washed underarms and private areas  -           Lower Body Dressing Assessment/Training    Lower Body Dressing [place] Level don;doff;pants/bottoms;shoes/slippers;socks;supervision;verbal cues   doffed sock only, does not wear typicallly per pt.  -ALBERT        Comment (Lower Body Dressing) Pt. able to flex at waist and complete dressing, but increased pain and time.   Pt. declined any AE.  Educated pt. to dress affected LLE first and undress last with pants and undergarment.  -ALBERT           Grooming Assessment/Training    [place] Level (Grooming) hair care, combing/brushing;oral care regimen;wash face, hands;supervision  -ALBERT        Grooming Position sink side  -ALBERT        Comment (Grooming) good balance and indep.  -ALBERT           Toileting Assessment/Training    [place] Level (Toileting) toileting skills;supervision   distant  -ALBERT        Assistive Devices (Toileting) grab bar/safety frame;raised toilet seat  -ALBERT        Comment (Toileting) per pt. she has a wall on each side of  toilet will use to boost up.  Educate on RTS and BSC if needed once home.  -ALBERT           BADL Safety/Performance    Impairments, BADL Safety/Performance strength;range of motion;balance  -ALBERT        Skilled BADL Treatment/Intervention compensatory training  -ALBERT        Progress in BADL Status improvement noted  -ALBERT           General ROM    GENERAL ROM COMMENTS WFL AROM BUe  -ALBERT           MMT (Manual Muscle Testing)    General MMT Comments strength functional for ADL's and transfer BUE  -ALBERT           Motor Assessment/Interventions    Additional Documentation Balance (Group);Balance Interventions (Group);Therapeutic Exercise (Group);Therapeutic Exercise Interventions (Group)  -ALBERT           Therapeutic Exercise    72342 - OT Therapeutic Activity Minutes 5  -ALBETR           Balance    Balance dynamic standing balance  -ALBERT           Dynamic Standing Balance    Level of [place], Reaches Outside Midline (Standing, Dynamic Balance) supervision  -ALBERT        Time Able to Maintain Position, Reaches Outside Midline (Standing, Dynamic Balance) more than 5 minutes   dressing, grooming, bathing, packing bag   -ALBERT           Sensory Assessment/Intervention    Sensory General Assessment no sensation deficits identified   none reported by pt. BUE  -ALBERT           Vision Assessment/Intervention    Visual  Impairment/Limitations WFL  -ALBERT           Positioning and Restraints    Pre-Treatment Position sitting in chair/recliner  -ALBERT        Post Treatment Position chair  -ALBERT        In Chair reclined;call light within reach;encouraged to call for assist;with family/caregiver  -ALBERT           Pain Scale: Numbers Pre/Post-Treatment    Pain Scale: Numbers, Pretreatment 2/10  -ALBERT        Pain Scale: Numbers, Post-Treatment 6/10  -ALBERT        Pain Location - Side Left  -ALBERT        Pain Location - Orientation generalized  -ALBERT        Pain Location knee  -ALBERT        Pain Intervention(s) Repositioned   nurse to bring pain meds  -ALBERT           Wound 10/11/18 0904 Left leg incision    Wound - Properties Group Date first assessed: 10/11/18  -MR Time first assessed: 0904  -MR Side: Left  -MR Location: leg  -MR Type: incision  -MR       Clinical Impression (OT)    Date of Referral to OT 10/11/18  -ALBERT        OT Diagnosis Impaired ADL and transfer knoweldge s/p L TKR  -ALBERT        Patient/Family Goals Statement (OT Eval) Pt. wants to return home with family assist and regain indep. as quickly as possible.  -ALBERT        Criteria for Skilled Therapeutic Interventions Met (OT Eval) yes;treatment indicated  -ALBERT        Rehab Potential (OT Eval) good, to achieve stated therapy goals  -ALBERT        Therapy Frequency (OT Eval) evaluation only  -ALBERT        Care Plan Review (OT) evaluation/treatment results reviewed;care plan/treatment goals reviewed;risks/benefits reviewed;current/potential barriers reviewed;patient/other agree to care plan  -ALBERT        Care Plan Review, Other Participant (OT Eval) son  -ALBERT        Anticipated Equipment Needs at Discharge (OT) front wheeled walker   recommend tub and toilet, LL , reacher, pt. declined  -ALBERT        Anticipated Discharge Disposition (OT) home with assist;home with OP services   OP PT  -ALBERT           Planned OT Interventions    Planned Therapy Interventions (OT Eval) adaptive equipment training;BADL  retraining;patient/caregiver education/training;transfer/mobility retraining  -ALBERT           OT Goals    Transfer Goal Selection (OT) transfer, OT goal 1  -ALBERT        Dressing Goal Selection (OT) dressing, OT goal 1  -ALBERT        Toileting Goal Selection (OT) toileting, OT goal 1  -ALBERT        Functional Mobility Goal Selection (OT) functional mobility, OT goal 1  -ALBERT        Additional Documentation Functional Mobility Selection (OT) (Row)  -ALBERT           Transfer Goal 1 (OT)    Activity/Assistive Device (Transfer Goal 1, OT) sit-to-stand/stand-to-sit;toilet;walker, rolling   AD prn  -ALBERT        [place] Level/Cues Needed (Transfer Goal 1, OT) supervision required  -ALBERT        Time Frame (Transfer Goal 1, OT) long term goal (LTG);1 day  -ALBERT        Progress/Outcome (Transfer Goal 1, OT) goal met  -ALBERT           Dressing Goal 1 (OT)    Activity/Assistive Device (Dressing Goal 1, OT) lower body dressing   protecting nerve cath and good sequencing for ease  -ALBERT        [place]/Cues Needed (Dressing Goal 1, OT) set-up required;supervision required;verbal cues required  -ALBERT        Time Frame (Dressing Goal 1, OT) long term goal (LTG);1 day  -ALBERT        Progress/Outcome (Dressing Goal 1, OT) goal met  -ALBERT           Toileting Goal 1 (OT)    Activity/Device (Toileting Goal 1, OT) toileting skills, all;grab bar/safety frame;raised toilet seat   protecting nerve cath  -ALBERT        [place] Level/Cues Needed (Toileting Goal 1, OT) supervision required  -ALBERT        Time Frame (Toileting Goal 1, OT) long term goal (LTG);1 day  -ALBERT        Progress/Outcome (Toileting Goal 1, OT) goal met  -ALBERT           Functional Mobility Goal 1 (OT)    Activity/Assistive Device (Functional Mobility Goal 1, OT) walker, rolling  -ALBERT        [place] Level/Cues Needed (Functional Mobility Goal 1, OT) supervision required  -ALBERT        Distance Goal 1 (Functional Mobility, OT) --   bathroom distance and back  -ALBERT        Time Frame (Functional Mobility  Goal 1, OT) long term goal (LTG);1 day  -ALBERT        Progress/Outcome (Functional Mobility Goal 1, OT) goal met  -ALBERT           Patient Education Goal (OT)    Activity (Patient Education Goal, OT) Available AE and AD to increase independence and decrease pain  -ALBERT        [place]/Cues/Accuracy (Memory Goal 2, OT) verbalizes understanding  -ALBERT        Time Frame (Patient Education Goal, OT) long term goal (LTG);1 day  -ALBERT        Progress/Outcome (Patient Education Goal, OT) goal met  -ALBERT           Living Environment    Home Accessibility not wheelchair accessible;tub/shower is not walk in;stairs to enter home   wx in shower in alternate room can use if needed  -ALBERT          User Key  (r) = Recorded By, (t) = Taken By, (c) = Cosigned By    Initials Name Effective Dates    Elisha Black, OT 06/08/18 -      Isabelle Hidalgo, RN -          Occupational Therapy Education     Title: PT OT SLP Therapies (Done)     Topic: Occupational Therapy (Done)     Point: ADL training (Done)     Description: Instruct learner(s) on proper safety adaptation and remediation techniques during self care or transfers.   Instruct in proper use of assistive devices.   Learning Progress Summary     Learner Status Readiness Method Response Comment Documented by    Patient Done Acceptance E,D,H VU,DU AE/AD avaible for increased independence, safety of nerve cath dressing and toileting, transfer safety.  10/12/18 0937    Family Done Acceptance E,D,H VU,DU AE/AD avaible for increased independence, safety of nerve cath dressing and toileting, transfer safety. ALBERT 10/12/18 0937          Point: Precautions (Done)     Description: Instruct learner(s) on prescribed precautions during self-care and functional transfers.   Learning Progress Summary     Learner Status Readiness Method Response Comment Documented by    Patient Done Acceptance E,D,H VU,DU AE/AD avaible for increased independence, safety of nerve cath dressing and toileting, transfer safety.   10/12/18 0937    Family Done Acceptance E,D,H VU,DU AE/AD avaible for increased independence, safety of nerve cath dressing and toileting, transfer safety.  10/12/18 0937                      User Key     Initials Effective Dates Name Provider Type Discipline     06/08/18 -  Elisha Sears, OT Occupational Therapist OT                OT Recommendation and Plan  Outcome Summary/Treatment Plan (OT)  Anticipated Equipment Needs at Discharge (OT): front wheeled walker (recommend tub and toilet, LL , reacher, pt. declined)  Anticipated Discharge Disposition (OT): home with assist, home with OP services  Planned Therapy Interventions (OT Eval): adaptive equipment training, BADL retraining, patient/caregiver education/training, transfer/mobility retraining  Therapy Frequency (OT Eval): evaluation only  Plan of Care Review  Plan of Care Reviewed With: family, patient  Plan of Care Reviewed With: family, patient  Outcome Summary: OT evaluation completed.  Post education pt. dressed self , groomed and sponged off with good independence and safety.  Supervision transfer stand and toilet.  Pt. declining any AE/AD.  Plans home with family assist and OP PT.  Goals met.           OT Rehab Goals     Row Name 10/12/18 0937             Transfer Goal 1 (OT)    Activity/Assistive Device (Transfer Goal 1, OT) sit-to-stand/stand-to-sit;toilet;walker, rolling   AD prn  -ALBERT      [place] Level/Cues Needed (Transfer Goal 1, OT) supervision required  -ALBERT      Time Frame (Transfer Goal 1, OT) long term goal (LTG);1 day  -ALBERT      Progress/Outcome (Transfer Goal 1, OT) goal met  -ALBERT         Dressing Goal 1 (OT)    Activity/Assistive Device (Dressing Goal 1, OT) lower body dressing   protecting nerve cath and good sequencing for ease  -ALBERT      [place]/Cues Needed (Dressing Goal 1, OT) set-up required;supervision required;verbal cues required  -ALBERT      Time Frame (Dressing Goal 1, OT) long term goal (LTG);1 day  -ALBERT       Progress/Outcome (Dressing Goal 1, OT) goal met  -ALBERT         Toileting Goal 1 (OT)    Activity/Device (Toileting Goal 1, OT) toileting skills, all;grab bar/safety frame;raised toilet seat   protecting nerve cath  -ALBERT      [place] Level/Cues Needed (Toileting Goal 1, OT) supervision required  -ALBERT      Time Frame (Toileting Goal 1, OT) long term goal (LTG);1 day  -ALBERT      Progress/Outcome (Toileting Goal 1, OT) goal met  -ALBERT         Patient Education Goal (OT)    Activity (Patient Education Goal, OT) Available AE and AD to increase independence and decrease pain  -ALBERT      [place]/Cues/Accuracy (Memory Goal 2, OT) verbalizes understanding  -ALBERT      Time Frame (Patient Education Goal, OT) long term goal (LTG);1 day  -ALBERT      Progress/Outcome (Patient Education Goal, OT) goal met  -ALBERT        User Key  (r) = Recorded By, (t) = Taken By, (c) = Cosigned By    Initials Name Provider Type Discipline    Elisha Black, OT Occupational Therapist OT                Outcome Measures     Row Name 10/12/18 0937 10/12/18 0820 10/11/18 1327       How much help from another person do you currently need...    Turning from your back to your side while in flat bed without using bedrails?  -- 3  -MJ 3  -LR    Moving from lying on back to sitting on the side of a flat bed without bedrails?  -- 3  -MJ 3  -LR    Moving to and from a bed to a chair (including a wheelchair)?  -- 3  -MJ 3  -LR    Standing up from a chair using your arms (e.g., wheelchair, bedside chair)?  -- 3  -MJ 3  -LR    Climbing 3-5 steps with a railing?  -- 3  -MJ 3  -LR    To walk in hospital room?  -- 3  -MJ 3  -LR    AM-PAC 6 Clicks Score  -- 18  -MJ 18  -LR       How much help from another is currently needed...    Putting on and taking off regular lower body clothing? 4  -ALBERT  --  --    Bathing (including washing, rinsing, and drying) 4  -ALBERT  --  --    Toileting (which includes using toilet bed pan or urinal) 4  -ALBERT  --  --    Putting on and taking off  regular upper body clothing 4  -ALBERT  --  --    Taking care of personal grooming (such as brushing teeth) 4  -ALBERT  --  --    Eating meals 4  -ALBERT  --  --    Score 24  -ALBERT  --  --       Functional Assessment    Outcome Measure Options AM-PAC 6 Clicks Daily Activity (OT)  -ALBERT AM-PAC 6 Clicks Basic Mobility (PT)  -LIANA AM-PAC 6 Clicks Basic Mobility (PT)  -LR      User Key  (r) = Recorded By, (t) = Taken By, (c) = Cosigned By    Initials Name Provider Type    Elisha Black, OT Occupational Therapist    LR Elli Bills, PT Physical Therapist    Rom Nice, PT Physical Therapist          Time Calculation:         Time Calculation- OT     Row Name 10/12/18 0937 10/12/18 0820          Time Calculation- OT    OT Start Time 0937  -ALBERT  --     OT Received On 10/12/18  -ALBERT  --        Timed Charges    58630 - Gait Training Minutes   -- 10  -MJ     37502 - OT Therapeutic Activity Minutes 5  -ALBERT  --     07591 - OT Self Care/Mgmt Minutes 10  -ALBERT  --       User Key  (r) = Recorded By, (t) = Taken By, (c) = Cosigned By    Initials Name Provider Type    Elisha Black, OT Occupational Therapist    Rom Nice, PT Physical Therapist        Therapy Suggested Charges     Code   Minutes Charges    37900 (CPT®) Hc Ot Neuromusc Re Education Ea 15 Min      76712 (CPT®) Hc Ot Ther Proc Ea 15 Min      82638 (CPT®) Hc Ot Therapeutic Act Ea 15 Min 5     72358 (CPT®) Hc Ot Manual Therapy Ea 15 Min      13411 (CPT®) Hc Ot Iontophoresis Ea 15 Min      57787 (CPT®) Hc Ot Elec Stim Ea-Per 15 Min      36000 (CPT®) Hc Ot Ultrasound Ea 15 Min      21652 (CPT®) Hc Ot Self Care/Mgmt/Train Ea 15 Min 10 1    Total  15 1        Therapy Charges for Today     Code Description Service Date Service Provider Modifiers Qty    87419242429 HC OT SELF CARE/MGMT/TRAIN EA 15 MIN 10/12/2018 Elisha Sears, OT GO 1    8791956 HC OT EVAL LOW COMPLEXITY 3 10/12/2018 Elisha Sears, OT GO 1               OT Discharge Summary  Anticipated  Discharge Disposition (OT): home with assist, home with OP services  Reason for Discharge: All goals achieved  Outcomes Achieved: Able to achieve all goals within established timeline    Elisha Sears, OT  10/12/2018

## 2018-10-12 NOTE — DISCHARGE SUMMARY
Patient Name: Evelyn Medina  MRN: [id number]  : 1955  DOS: 10/12/2018    Attending: Dewey Jimenez,*    Primary Care Provider: Provider, No Known    Date of Admission:.10/11/2018  5:31 AM    Date of Discharge:  10/12/2018    Discharge Diagnosis:   Status post total left knee replacement    Primary osteoarthritis of left knee    Acute postoperative pain    Acute blood loss anemia, mild, asymptomatic      Hospital Course  Patient is a 63 y.o. female presented for left total knee arthroplasty by Dr. Jimenez under spinal anesthesia. She tolerated surgery well and was admitted for further medical management. Her knee has been painful for about a year and a half. She denies use of assistive device for ambulation or recent falls.      Patient was provided pain medications as needed for pain control, along with adductor canal nerve block infusion of Ropivacaine.    Adjustments were made to pain medications to optimize postop pain management. Risks and benefits of opiate medications discussed with patient.    She was seen by PT and OT and has progressed well over her stay.  She used an IS for atelectasis prophylaxis and aspirin along with mechanicals for DVT prophylaxis.  Home medications were resumed as appropriate, and labs were monitored and remained fairly stable.     With the progress she has made, she is ready for DC home today.    A prineo dressing is in place and is to remain for 2 weeks.  She will have an On Q pump ( instructed on it during this admit)  Discussed with patient regarding plan and she shows understanding and agreement.    Patient will have HHPT following discharge.      Procedures Performed  Preoperative diagnosis:Left knee end stage osteoarthritis     Postoperative diagnosis: Left knee end stage osteoarthritis     Operative procedure: Left total knee arthroplasty     Surgeon: Dr. Tesfaye Jimenez       Pertinent Test Results:    I reviewed the patient's new clinical results.     Results from  "last 7 days  Lab Units 10/12/18  0430   WBC 10*3/mm3 8.18   HEMOGLOBIN g/dL 11.3*   HEMATOCRIT % 35.6   PLATELETS 10*3/mm3 172       Results from last 7 days  Lab Units 10/12/18  0430   SODIUM mmol/L 134   POTASSIUM mmol/L 3.6   CHLORIDE mmol/L 104   CO2 mmol/L 25.0   BUN mg/dL 14   CREATININE mg/dL 0.62   CALCIUM mg/dL 8.5*   GLUCOSE mg/dL 113*     I reviewed the patient's new imaging including images and reports.      Physical therapy: Pt increased gait distance to 300 feet with CGA and RW, limited by fatigue. Pt progressed to stair training x1 with RW to mimic home environment and ROM measured 9-91 degrees. Pt is discharging home today with outpatient PT, made good progress toward goals during inpatient stay    Discharge Assessment:    Vital Signs  /53 (BP Location: Right arm, Patient Position: Lying)   Pulse 80   Temp 99.1 °F (37.3 °C) (Oral)   Resp 18   Ht 167.6 cm (66\")   Wt 92.5 kg (203 lb 14.8 oz)   SpO2 91%   BMI 32.91 kg/m²   Temp (24hrs), Av.8 °F (37.1 °C), Min:98.3 °F (36.8 °C), Max:99.4 °F (37.4 °C)      General Appearance:    Alert, cooperative, in no acute distress   Lungs:     Clear to auscultation,respirations regular, even and                   unlabored    Heart:    Regular rhythm and normal rate, normal S1 and S2   Abdomen:     Normal bowel sounds, no masses, no organomegaly, soft        non-tender, non-distended, no guarding, no rebound                 tenderness   Extremities:   Moves all extremities well, no edema, no cyanosis, no              Redness. Left knee Prineo CDI. Nerve block present   Pulses:   Pulses palpable and equal bilaterally   Skin:   No bleeding, bruising or rash   Neurologic:   Cranial nerves 2 - 12 grossly intact, sensation intact. Flexion and dorsiflexion intact bilateral feet.       Discharge Disposition: Home    Discharge Medications     Discharge Medications      New Medications      Instructions Start Date   aspirin 325 MG EC tablet   325 mg, Oral, " Daily      docusate sodium 100 MG capsule   100 mg, Oral, 2 Times Daily PRN      HYDROcodone-acetaminophen 7.5-325 MG per tablet  Commonly known as:  NORCO   1 tablet, Oral, Every 4 Hours PRN         Continue These Medications      Instructions Start Date   acetaminophen 325 MG tablet  Commonly known as:  TYLENOL   650 mg, Oral, Every 6 Hours PRN      polyethyl glycol-propyl glycol 0.4-0.3 % solution ophthalmic solution  Commonly known as:  SYSTANE   1 drop, Both Eyes, Nightly      SENNA LAX PO   1 tablet, Oral, Daily      WOMENS MULTIVITAMIN PO   1 tablet, Oral, Daily      XIIDRA OP   1 drop, Both Eyes, 2 Times Daily             Discharge Diet: Regular diet    Activity at Discharge: WBAT LLE    Follow-up Appointments  Dr. Jimenez per his orders    Discharge took over 30 min.    HARINDER Kelly  10/12/18  11:15 AM

## 2018-10-12 NOTE — PROGRESS NOTES
"/54 (BP Location: Left arm, Patient Position: Lying)   Pulse 85   Temp 98.8 °F (37.1 °C) (Oral)   Resp 18   Ht 167.6 cm (66\")   Wt 92.5 kg (203 lb 14.8 oz)   SpO2 95%   BMI 32.91 kg/m²     Lab Results (last 24 hours)     Procedure Component Value Units Date/Time    Basic Metabolic Panel [[id number]]  (Abnormal) Collected:  10/12/18 0430    Specimen:  Blood Updated:  10/12/18 0518     Glucose 113 (H) mg/dL      BUN 14 mg/dL      Creatinine 0.62 mg/dL      Sodium 134 mmol/L      Potassium 3.6 mmol/L      Chloride 104 mmol/L      CO2 25.0 mmol/L      Calcium 8.5 (L) mg/dL      eGFR Non African Amer 98 mL/min/1.73      BUN/Creatinine Ratio 22.6     Anion Gap 5.0 mmol/L     Narrative:       National Kid[employer] Guidelines    Stage     Description        GFR  1         Normal or High     90+  2         Mild decrease      60-89  3         Moderate decrease  30-59  4         Severe decrease    15-29  5         Kidney failure     <15    The MDRD GFR formula is only valid for adults with stable renal function between ages 18 and 70.    CBC & Differential [[id number]] Collected:  10/12/18 0430    Specimen:  Blood Updated:  10/12/18 0514    Narrative:       The following orders were created for panel order CBC & Differential.  Procedure                               Abnormality         Status                     ---------                               -----------         ------                     CBC Auto Differential[[id number]]        Abnormal            Final result                 Please view results for these tests on the individual orders.    CBC Auto Differential [[id number]]  (Abnormal) Collected:  10/12/18 0430    Specimen:  Blood Updated:  10/12/18 0514     WBC 8.18 10*3/mm3      RBC 4.10 10*6/mm3      Hemoglobin 11.3 (L) g/dL      Hematocrit 35.6 %      MCV 86.8 fL      MCH 27.6 pg      MCHC 31.7 (L) g/dL      RDW 12.8 %      RDW-SD 40.7 fl      MPV 10.8 fL      Platelets 172 10*3/mm3      Neutrophil " % 67.1 %      Lymphocyte % 17.0 (L) %      Monocyte % 13.8 (H) %      Eosinophil % 1.7 %      Basophil % 0.4 %      Immature Grans % 0.1 %      Neutrophils, Absolute 5.49 10*3/mm3      Lymphocytes, Absolute 1.39 10*3/mm3      Monocytes, Absolute 1.13 (H) 10*3/mm3      Eosinophils, Absolute 0.14 10*3/mm3      Basophils, Absolute 0.03 10*3/mm3      Immature Grans, Absolute 0.01 10*3/mm3           Imaging Results (last 24 hours)     Procedure Component Value Units Date/Time    XR Knee 1 or 2 View Left [[id number]] Collected:  10/11/18 1016     Updated:  10/11/18 1420    Narrative:       EXAMINATION: XR KNEE 1 OR 2 VW LEFT- 10/11/2018     INDICATION: Post-Op Knee Arthoplasty      COMPARISON: NONE     FINDINGS: 2 views of the left knee reveal patient to be status post  total left knee arthroplasty. Postsurgical changes seen in the soft  tissues. No hardware complication or malalignment identified of the  prosthesis.           Impression:       Status post total left hip arthroplasty with postsurgical  changes seen within the soft tissues.     D:  10/11/2018  E:  10/11/2018     This report was finalized on 10/11/2018 2:18 PM by Dr. Miranda Le MD.             Patient Care Team:  Provider, No Known as PCP - General    SUBJECTIVE: Evelyn reports she is doing well.  Reports her eye feels much better this morning.  Knee pain is well-controlled.  Tolerating a regular diet.  She walked 130 feet with therapy yesterday and 300 feet this morning.  She would like to go home today.    PHYSICAL EXAM  Left knee incision is clean, dry and intact with mesh dressing in place  Thigh and calf soft and nontender  Neurovascular intact distally       Status post total left knee replacement    Primary osteoarthritis of left knee      PLAN / DISPOSITION: 63-year-old female postop day #1 status post left total knee arthroplasty  -Continue to mobilize with therapy as tolerated  -Aspirin for DVT prophylaxis, H&H stable  -Okay to shower  with mesh dressing in place once pain catheter removed  -Plan home later today with outpatient physical therapy at [facility]  -Follow-up in 2 weeks        :Dewey Jimenez MD  10/12/18  7:42 AM

## 2018-10-12 NOTE — PLAN OF CARE
Problem: Patient Care Overview  Goal: Plan of Care Review  Outcome: Ongoing (interventions implemented as appropriate)   10/12/18 0820   Plan of Care Review   Progress improving   OTHER   Outcome Summary Pt increased gait distance to 300 feet with CGA and RW, limited by fatigue. Pt progressed to stair training x1 with RW to mimic home environment and ROM measured 9-91 degrees. Pt is discharging home today with outpatient PT, made good progress toward goals during inpatient stay   Coping/Psychosocial   Plan of Care Reviewed With patient;family       Problem: Knee Arthroplasty (Total, Partial) (Adult)  Goal: Signs and Symptoms of Listed Potential Problems Will be Absent, Minimized or Managed (Knee Arthroplasty)  Outcome: Ongoing (interventions implemented as appropriate)   10/12/18 0820   Goal/Outcome Evaluation   Problems Assessed (Knee Arthroplasty) functional deficit;pain;range of motion decreased   Problems Present (Knee Arthroplasty) functional deficit;pain;range of motion decreased

## 2018-10-12 NOTE — PROGRESS NOTES
Discharge Planning Assessment  [facility]     Patient Name: Evelyn Medina  MRN: [id number]  Today's Date: 10/12/2018    Admit Date: 10/11/2018          Discharge Needs Assessment     Row Name 10/12/18 1050       Living Environment    Lives With child(subhash), adult;grandchild(subhash)    Name(s) of Who Lives With Patient Son: Tom, his wife and children    Current Living Arrangements home/apartment/condo    Family Caregiver if Needed child(subhash), adult    Quality of Family Relationships helpful;involved;supportive    Able to Return to Prior Arrangements yes    Living Arrangement Comments Ms. Medina lives with her adult son, Tom, his wife and children in a one story home, one step, in [facility].  Tom stated that he and his family are available to assist the patient at home as needed.       Resource/Environmental Concerns    Transportation Concerns car, none       Transition Planning    Patient/Family Anticipates Transition to home with family    Transportation Anticipated family or friend will provide       Discharge Needs Assessment    Equipment Currently Used at Home none    Equipment Needed After Discharge walker, rolling    Outpatient/Agency/Support Group Needs --   Outpatient PT    Offered/Gave Vendor List yes    Patient's Choice of Community Agency(s) [facility] in [place], [place]            Discharge Plan     Row Name 10/12/18 1059       Plan    Plan Home with family assistance and outpatient PT at [facility] in [place], [place]    Patient/Family in Agreement with Plan yes    Plan Comments Met with Ms. Medina and her son, Tom, at the [facility] for discharge planning.  Ms. Medina is ambulating with a rolling walker.  She requested a rolling walker for home use and did not have preference for provider.  Contacted [employer] and they will be delivering the walker to the hospital room [facility] [place].  Discussed physical therapy and Ms. Medina requested outpatient PT at [facility] in [place].  Faxed orders and  clinical to [facility].  Ms. Medina is being transported home by her son by private vehicle.      Final Discharge Disposition Code 01 - home or self-care        Destination     No service coordination in this encounter.      Durable Medical Equipment - Selection Complete     Service Request Status Selected Specialties Address Phone Number Fax Number    [facility] Selected [facility] Services [address] [address] [phone]      Dialysis/Infusion     No service coordination in this encounter.      Home Medical Care     No service coordination in this encounter.      Social Care     No service coordination in this encounter.        Expected Discharge Date and Time     Expected Discharge Date Expected Discharge Time    Oct 12, 2018               Demographic Summary     Row Name 10/12/18 1048       General Information    Admission Type inpatient    Arrived From home    Reason for Consult discharge planning    General Information Comments Does not have a PCP and denies any need to be referred.       Contact Information    Permission Granted to Share Info With     Contact Information Comments Son:  sujit Santos [phone]            Functional Status     Row Name 10/12/18 1049       Functional Status    Usual Activity Tolerance good    Current Activity Tolerance --   See PT notes       Functional Status, IADL    Medications independent    Meal Preparation independent    Housekeeping independent    Laundry independent    Shopping independent       Mental Status    General Appearance WDL WDL            Psychosocial    No documentation.           Abuse/Neglect    No documentation.           Legal     Row Name 10/12/18 1049       Financial/Legal    Who Manages Finances if Patient Unable No advance directives.    Finance Comments Ms. Medina has prescription drug coverage with [employer].  Verified insurance with patient.            Substance Abuse    No  documentation.           Patient Forms    No documentation.         Vanda Foreman

## 2018-10-13 NOTE — PROGRESS NOTES
[facility]    Acute pain service Inpatient Progress Note    Patient Name: Evelyn Medina  :  1955  MRN:  [id number]        Acute Pain  Service Inpatient Progress Note:    Comments: DC'd yesterday

## 2018-10-14 NOTE — PROGRESS NOTES
[facility]    Nerve Cath Post Op Call    Patient Name: Evelyn Medina  :  1955  MRN:  [id number]  Date of Discharge: 10/12/2018    Treatment Plan

## 2021-02-05 ENCOUNTER — IMMUNIZATION (OUTPATIENT)
Dept: VACCINE CLINIC | Facility: HOSPITAL | Age: 66
End: 2021-02-05

## 2021-02-05 PROCEDURE — 0001A: CPT | Performed by: INTERNAL MEDICINE

## 2021-02-05 PROCEDURE — 91300 HC SARSCOV02 VAC 30MCG/0.3ML IM: CPT | Performed by: INTERNAL MEDICINE

## 2021-02-26 ENCOUNTER — IMMUNIZATION (OUTPATIENT)
Dept: VACCINE CLINIC | Facility: HOSPITAL | Age: 66
End: 2021-02-26

## 2021-02-26 PROCEDURE — 0002A: CPT | Performed by: INTERNAL MEDICINE

## 2021-02-26 PROCEDURE — 91300 HC SARSCOV02 VAC 30MCG/0.3ML IM: CPT | Performed by: INTERNAL MEDICINE

## 2021-08-30 ENCOUNTER — TRANSCRIBE ORDERS (OUTPATIENT)
Dept: ADMINISTRATIVE | Facility: HOSPITAL | Age: 66
End: 2021-08-30

## 2021-08-30 DIAGNOSIS — Z01.818 OTHER SPECIFIED PRE-OPERATIVE EXAMINATION: Primary | ICD-10-CM

## 2021-09-03 ENCOUNTER — LAB (OUTPATIENT)
Dept: LAB | Facility: HOSPITAL | Age: 66
End: 2021-09-03

## 2021-09-03 DIAGNOSIS — Z01.818 OTHER SPECIFIED PRE-OPERATIVE EXAMINATION: ICD-10-CM

## 2021-09-03 LAB — SARS-COV-2 RNA PNL SPEC NAA+PROBE: NOT DETECTED

## 2021-09-03 PROCEDURE — C9803 HOPD COVID-19 SPEC COLLECT: HCPCS

## 2021-09-03 PROCEDURE — U0004 COV-19 TEST NON-CDC HGH THRU: HCPCS | Performed by: OPHTHALMOLOGY

## (undated) DEVICE — MEDI-VAC NON-CONDUCTIVE SUCTION TUBING: Brand: CARDINAL HEALTH

## (undated) DEVICE — T4 PULLOVER TOGA, LARGE

## (undated) DEVICE — MEDI-VAC YANKAUER SUCTION HANDLE W/BULBOUS TIP: Brand: CARDINAL HEALTH

## (undated) DEVICE — ENCORE® LATEX MICRO SIZE 8, STERILE LATEX POWDER-FREE SURGICAL GLOVE: Brand: ENCORE

## (undated) DEVICE — INTENDED USE FOR SURGICAL MARKING ON INTACT SKIN, ALSO PROVIDES A PERMANENT METHOD OF IDENTIFYING OBJECTS IN THE OPERATING ROOM: Brand: WRITESITE® REGULAR TIP SKIN MARKER

## (undated) DEVICE — SOL LR 1000ML

## (undated) DEVICE — CANN NASL CO2 DIVIDED A/

## (undated) DEVICE — AIRWY 90MM NO9

## (undated) DEVICE — CVR HNDL LT SURG ACCSSRY BLU STRL

## (undated) DEVICE — UNDERCAST PADDING: Brand: DEROYAL

## (undated) DEVICE — PUMP PAIN AUTOFUSER AUTO SELCT NOBOLUS 1TO14ML/HR 550ML DISP

## (undated) DEVICE — NERVE BLOCK SUPPORT KIT/BLUE: Brand: MEDLINE INDUSTRIES, INC.

## (undated) DEVICE — DRSNG PAD ABD 8X10IN STRL

## (undated) DEVICE — PK KN TOTL 10

## (undated) DEVICE — STERILE PVP: Brand: MEDLINE INDUSTRIES, INC.

## (undated) DEVICE — ANTIBACTERIAL UNDYED BRAIDED (POLYGLACTIN 910), SYNTHETIC ABSORBABLE SUTURE: Brand: COATED VICRYL

## (undated) DEVICE — TRY EPID SFTY 18G 3.5IN 1T7680

## (undated) DEVICE — BNDG ELAS CO-FLEX SLF ADHR 6IN 5YD LF STRL

## (undated) DEVICE — STRYKER PERFORMANCE SERIES SAGITTAL BLADE: Brand: STRYKER PERFORMANCE SERIES

## (undated) DEVICE — BNDG ELAS W/CLIP 6IN 10YD LF STRL

## (undated) DEVICE — ST NERV BLCK CONT CONTIPLEX ECHO CLSD 18G 4IN

## (undated) DEVICE — Device

## (undated) DEVICE — NDL SPINE 18G 31/2IN PNK

## (undated) DEVICE — PAD,ABDOMINAL,8"X10",ST,LF: Brand: MEDLINE

## (undated) DEVICE — SPNG GZ WOVN 4X4IN 12PLY 10/BX STRL

## (undated) DEVICE — ADAPT ST INFUS ADMIN SYR 70IN

## (undated) DEVICE — SYS SKIN CLS DERMABOND PRINEO W/22CM MESH TP